# Patient Record
Sex: MALE | Race: OTHER | Employment: OTHER | URBAN - NONMETROPOLITAN AREA
[De-identification: names, ages, dates, MRNs, and addresses within clinical notes are randomized per-mention and may not be internally consistent; named-entity substitution may affect disease eponyms.]

---

## 2017-01-25 DIAGNOSIS — I10 BENIGN ESSENTIAL HTN: Chronic | ICD-10-CM

## 2017-01-25 DIAGNOSIS — E78.5 HYPERLIPIDEMIA, UNSPECIFIED HYPERLIPIDEMIA TYPE: ICD-10-CM

## 2017-01-25 RX ORDER — NEBIVOLOL 10 MG/1
10 TABLET ORAL DAILY
Qty: 90 TABLET | Refills: 3 | Status: SHIPPED | OUTPATIENT
Start: 2017-01-25 | End: 2017-08-15 | Stop reason: SDUPTHER

## 2017-02-14 ENCOUNTER — OFFICE VISIT (OUTPATIENT)
Dept: CARDIOLOGY | Age: 67
End: 2017-02-14
Payer: MEDICARE

## 2017-02-14 VITALS
DIASTOLIC BLOOD PRESSURE: 78 MMHG | WEIGHT: 276 LBS | SYSTOLIC BLOOD PRESSURE: 120 MMHG | HEART RATE: 60 BPM | HEIGHT: 71 IN | BODY MASS INDEX: 38.64 KG/M2

## 2017-02-14 DIAGNOSIS — I25.10 CORONARY ARTERY DISEASE INVOLVING NATIVE CORONARY ARTERY OF NATIVE HEART WITHOUT ANGINA PECTORIS: ICD-10-CM

## 2017-02-14 DIAGNOSIS — I10 BENIGN ESSENTIAL HTN: Primary | ICD-10-CM

## 2017-02-14 DIAGNOSIS — E78.5 HYPERLIPIDEMIA, UNSPECIFIED HYPERLIPIDEMIA TYPE: ICD-10-CM

## 2017-02-14 DIAGNOSIS — E78.2 MIXED HYPERLIPIDEMIA: ICD-10-CM

## 2017-02-14 PROCEDURE — 99212 OFFICE O/P EST SF 10 MIN: CPT | Performed by: INTERNAL MEDICINE

## 2017-02-14 RX ORDER — ALLOPURINOL 300 MG/1
300 TABLET ORAL DAILY
COMMUNITY

## 2017-02-15 RX ORDER — TRIAMTERENE AND HYDROCHLOROTHIAZIDE 75; 50 MG/1; MG/1
1 TABLET ORAL DAILY
Qty: 90 TABLET | Refills: 3 | Status: SHIPPED | OUTPATIENT
Start: 2017-02-15 | End: 2017-08-15 | Stop reason: SDUPTHER

## 2017-02-15 RX ORDER — LOSARTAN POTASSIUM 50 MG/1
50 TABLET ORAL DAILY
Qty: 90 TABLET | Refills: 3 | Status: SHIPPED | OUTPATIENT
Start: 2017-02-15 | End: 2017-08-15 | Stop reason: SDUPTHER

## 2017-08-15 ENCOUNTER — TELEPHONE (OUTPATIENT)
Dept: CARDIOLOGY | Age: 67
End: 2017-08-15

## 2017-08-15 ENCOUNTER — OFFICE VISIT (OUTPATIENT)
Dept: CARDIOLOGY | Age: 67
End: 2017-08-15
Payer: MEDICARE

## 2017-08-15 VITALS
WEIGHT: 273 LBS | DIASTOLIC BLOOD PRESSURE: 80 MMHG | HEIGHT: 71 IN | BODY MASS INDEX: 38.22 KG/M2 | HEART RATE: 78 BPM | SYSTOLIC BLOOD PRESSURE: 122 MMHG

## 2017-08-15 DIAGNOSIS — E78.2 MIXED HYPERLIPIDEMIA: Chronic | ICD-10-CM

## 2017-08-15 DIAGNOSIS — I10 BENIGN ESSENTIAL HTN: Chronic | ICD-10-CM

## 2017-08-15 DIAGNOSIS — I25.10 CORONARY ARTERY DISEASE INVOLVING NATIVE CORONARY ARTERY OF NATIVE HEART WITHOUT ANGINA PECTORIS: Primary | Chronic | ICD-10-CM

## 2017-08-15 DIAGNOSIS — E78.5 HYPERLIPIDEMIA, UNSPECIFIED HYPERLIPIDEMIA TYPE: ICD-10-CM

## 2017-08-15 DIAGNOSIS — Z95.5 HISTORY OF PLACEMENT OF STENT IN LAD CORONARY ARTERY: ICD-10-CM

## 2017-08-15 PROCEDURE — 99213 OFFICE O/P EST LOW 20 MIN: CPT | Performed by: NURSE PRACTITIONER

## 2017-08-15 PROCEDURE — 93000 ELECTROCARDIOGRAM COMPLETE: CPT | Performed by: NURSE PRACTITIONER

## 2017-08-15 RX ORDER — NITROGLYCERIN 0.4 MG/1
0.4 TABLET SUBLINGUAL EVERY 5 MIN PRN
Qty: 25 TABLET | Refills: 3 | Status: SHIPPED | OUTPATIENT
Start: 2017-08-15 | End: 2020-07-23 | Stop reason: SDUPTHER

## 2017-08-15 RX ORDER — SIMVASTATIN 10 MG
10 TABLET ORAL NIGHTLY
Qty: 90 TABLET | Refills: 3 | Status: SHIPPED | OUTPATIENT
Start: 2017-08-15

## 2017-08-15 RX ORDER — NEBIVOLOL 10 MG/1
10 TABLET ORAL DAILY
Qty: 90 TABLET | Refills: 3 | Status: SHIPPED | OUTPATIENT
Start: 2017-08-15 | End: 2018-03-01 | Stop reason: SDUPTHER

## 2017-08-15 RX ORDER — FUROSEMIDE 20 MG/1
20 TABLET ORAL DAILY
Qty: 90 TABLET | Refills: 3 | Status: SHIPPED | OUTPATIENT
Start: 2017-08-15 | End: 2018-08-28 | Stop reason: SDUPTHER

## 2017-08-15 RX ORDER — TRIAMTERENE AND HYDROCHLOROTHIAZIDE 75; 50 MG/1; MG/1
1 TABLET ORAL DAILY
Qty: 90 TABLET | Refills: 3 | Status: SHIPPED | OUTPATIENT
Start: 2017-08-15 | End: 2017-08-15 | Stop reason: ALTCHOICE

## 2017-08-15 RX ORDER — POTASSIUM CHLORIDE 20 MEQ/1
20 TABLET, EXTENDED RELEASE ORAL 2 TIMES DAILY
Qty: 180 TABLET | Refills: 3 | Status: SHIPPED | OUTPATIENT
Start: 2017-08-15

## 2017-08-15 RX ORDER — LOSARTAN POTASSIUM 50 MG/1
50 TABLET ORAL DAILY
Qty: 90 TABLET | Refills: 3 | Status: SHIPPED | OUTPATIENT
Start: 2017-08-15 | End: 2018-02-20

## 2018-01-10 ENCOUNTER — APPOINTMENT (OUTPATIENT)
Dept: LAB | Facility: HOSPITAL | Age: 68
End: 2018-01-10

## 2018-01-19 DIAGNOSIS — D50.8 OTHER IRON DEFICIENCY ANEMIA: Primary | ICD-10-CM

## 2018-01-22 ENCOUNTER — APPOINTMENT (OUTPATIENT)
Dept: LAB | Facility: HOSPITAL | Age: 68
End: 2018-01-22

## 2018-02-20 ENCOUNTER — OFFICE VISIT (OUTPATIENT)
Dept: CARDIOLOGY | Age: 68
End: 2018-02-20
Payer: MEDICARE

## 2018-02-20 VITALS
HEART RATE: 80 BPM | DIASTOLIC BLOOD PRESSURE: 104 MMHG | WEIGHT: 280 LBS | HEIGHT: 71 IN | BODY MASS INDEX: 39.2 KG/M2 | SYSTOLIC BLOOD PRESSURE: 150 MMHG | RESPIRATION RATE: 16 BRPM

## 2018-02-20 DIAGNOSIS — I25.10 CORONARY ARTERY DISEASE INVOLVING NATIVE CORONARY ARTERY OF NATIVE HEART WITHOUT ANGINA PECTORIS: Primary | ICD-10-CM

## 2018-02-20 DIAGNOSIS — I10 BENIGN ESSENTIAL HTN: ICD-10-CM

## 2018-02-20 DIAGNOSIS — E78.2 MIXED HYPERLIPIDEMIA: ICD-10-CM

## 2018-02-20 PROCEDURE — 99213 OFFICE O/P EST LOW 20 MIN: CPT | Performed by: INTERNAL MEDICINE

## 2018-02-20 RX ORDER — TAMSULOSIN HYDROCHLORIDE 0.4 MG/1
CAPSULE ORAL
Refills: 6 | COMMUNITY
Start: 2018-01-21 | End: 2019-05-28 | Stop reason: DRUGHIGH

## 2018-02-20 RX ORDER — LOSARTAN POTASSIUM 50 MG/1
50 TABLET ORAL DAILY
Refills: 5 | COMMUNITY
Start: 2017-12-31 | End: 2019-12-31 | Stop reason: SDUPTHER

## 2018-02-20 NOTE — PROGRESS NOTES
twice daily  No Continue current medications:       Yes           Discussed with patient and spouse. Follow Up Visit Scheduled for:  1 month(s)    I greatly appreciate the opportunity to meet Sharda Wood and your confidence in allowing me to participate in his cardiovascular care. [facility] of [address] [place] [address] am scribing for and in the presence of ERICA Pardo MD,FAC. Gladis Chavez MA     12:01 PM  IERICA MD, [facility], personally performed the services described in this documentation as scribed by Gladis Chavez in my presence, and it is both accurate and complete. Electronically signed by Sami Smith.  Junie Pardo MD, [facility]    2/22/18 2:10 PM

## 2018-03-01 DIAGNOSIS — I10 BENIGN ESSENTIAL HTN: Chronic | ICD-10-CM

## 2018-03-01 DIAGNOSIS — E78.5 HYPERLIPIDEMIA, UNSPECIFIED HYPERLIPIDEMIA TYPE: ICD-10-CM

## 2018-03-01 RX ORDER — NEBIVOLOL HYDROCHLORIDE 10 MG/1
10 TABLET ORAL DAILY
Qty: 90 TABLET | Refills: 3 | Status: SHIPPED | OUTPATIENT
Start: 2018-03-01 | End: 2018-04-02 | Stop reason: DRUGHIGH

## 2018-03-02 DIAGNOSIS — D50.9 IRON DEFICIENCY ANEMIA, UNSPECIFIED IRON DEFICIENCY ANEMIA TYPE: Primary | ICD-10-CM

## 2018-03-06 ENCOUNTER — APPOINTMENT (OUTPATIENT)
Dept: LAB | Facility: HOSPITAL | Age: 68
End: 2018-03-06

## 2018-04-02 ENCOUNTER — OFFICE VISIT (OUTPATIENT)
Dept: CARDIOLOGY | Age: 68
End: 2018-04-02
Payer: MEDICARE

## 2018-04-02 VITALS
SYSTOLIC BLOOD PRESSURE: 136 MMHG | WEIGHT: 273 LBS | BODY MASS INDEX: 38.22 KG/M2 | DIASTOLIC BLOOD PRESSURE: 78 MMHG | HEART RATE: 68 BPM | HEIGHT: 71 IN

## 2018-04-02 DIAGNOSIS — I25.10 CORONARY ARTERY DISEASE INVOLVING NATIVE CORONARY ARTERY OF NATIVE HEART WITHOUT ANGINA PECTORIS: Primary | Chronic | ICD-10-CM

## 2018-04-02 PROCEDURE — 93000 ELECTROCARDIOGRAM COMPLETE: CPT | Performed by: NURSE PRACTITIONER

## 2018-04-02 PROCEDURE — 99214 OFFICE O/P EST MOD 30 MIN: CPT | Performed by: NURSE PRACTITIONER

## 2018-04-02 RX ORDER — NEBIVOLOL 10 MG/1
10 TABLET ORAL 2 TIMES DAILY
COMMUNITY
End: 2018-05-14 | Stop reason: SDUPTHER

## 2018-05-14 RX ORDER — NEBIVOLOL 10 MG/1
10 TABLET ORAL 2 TIMES DAILY
Qty: 60 TABLET | Refills: 5 | Status: SHIPPED | OUTPATIENT
Start: 2018-05-14 | End: 2019-01-15 | Stop reason: SDUPTHER

## 2018-08-28 DIAGNOSIS — E78.2 MIXED HYPERLIPIDEMIA: Chronic | ICD-10-CM

## 2018-08-28 DIAGNOSIS — I10 BENIGN ESSENTIAL HTN: Chronic | ICD-10-CM

## 2018-08-28 DIAGNOSIS — I25.10 CORONARY ARTERY DISEASE INVOLVING NATIVE CORONARY ARTERY OF NATIVE HEART WITHOUT ANGINA PECTORIS: Chronic | ICD-10-CM

## 2018-08-28 RX ORDER — FUROSEMIDE 20 MG/1
20 TABLET ORAL DAILY
Qty: 90 TABLET | Refills: 3 | Status: SHIPPED | OUTPATIENT
Start: 2018-08-28 | End: 2019-12-31 | Stop reason: SDUPTHER

## 2018-10-08 ENCOUNTER — OFFICE VISIT (OUTPATIENT)
Dept: CARDIOLOGY | Age: 68
End: 2018-10-08
Payer: MEDICARE

## 2018-10-08 VITALS
HEART RATE: 64 BPM | WEIGHT: 279 LBS | BODY MASS INDEX: 39.06 KG/M2 | SYSTOLIC BLOOD PRESSURE: 118 MMHG | HEIGHT: 71 IN | DIASTOLIC BLOOD PRESSURE: 68 MMHG

## 2018-10-08 DIAGNOSIS — I10 BENIGN ESSENTIAL HTN: Primary | Chronic | ICD-10-CM

## 2018-10-08 PROCEDURE — 99213 OFFICE O/P EST LOW 20 MIN: CPT | Performed by: NURSE PRACTITIONER

## 2018-10-08 PROCEDURE — 93000 ELECTROCARDIOGRAM COMPLETE: CPT | Performed by: NURSE PRACTITIONER

## 2018-10-08 RX ORDER — FINASTERIDE 5 MG/1
5 TABLET, FILM COATED ORAL DAILY
COMMUNITY

## 2018-10-08 RX ORDER — POTASSIUM CHLORIDE 14.9 MG/ML
20 INJECTION INTRAVENOUS ONCE
COMMUNITY
End: 2019-05-28 | Stop reason: ALTCHOICE

## 2018-10-08 RX ORDER — TRIAMTERENE AND HYDROCHLOROTHIAZIDE 37.5; 25 MG/1; MG/1
1 TABLET ORAL DAILY
COMMUNITY
End: 2019-12-31 | Stop reason: SDUPTHER

## 2018-10-08 NOTE — PATIENT INSTRUCTIONS
Coronary Artery Disease   (CAD; Coronary Atherosclerosis; Silent MI; Coronary Heart Disease; Ischemic Heart Disease; Atherosclerosis of the Coronary Arteries)     Definition   Coronary arteries bring oxygen rich blood to the heart muscle. Coronary artery disease (CAD) is blockage of these arteries. If the blockage is complete, areas of the heart muscle may be damaged. In severe case the heart muscle dies. This can lead to a heart attack, also known as a myocardial infarction (MI). Coronary artery disease is the most common form of heart disease. It is the leading cause of death worldwide. Causes include: Thickening of the walls of the arteries feeding the heart muscle   Accumulation of fatty plaques within the coronary arteries   Sudden spasm of a coronary artery   Narrowing of the coronary arteries   Inflammation within the coronary arteries   Development of a blood clot within the coronary arteries that blocks blood flow      Major risk factors include:   Sex: male (men have a greater risk of heart attack than women)   Age: 39 and older for men, 54 and older for women   Heredity: strong family history of heart disease   Obesity and being overweight   Smoking   High blood pressure   Sedentary lifestylePoor fitness can also increase your risk of CAD and premature death. High cholesterol (specifically, high LDL cholesterol, and low HDL cholesterol)   Diabetes   Metabolic syndrome (combination of high blood pressure, abdominal obesity, and insulin resistance)     Other risk factors may include:   Sleep Apnea  Stress   Excessive alcohol use   Depression   A diet that is high in saturated fat, trans fat, cholesterol, and/or caloriesDrinking sugary beverages on a regular basis may increase your risk of CAD. Symptoms   CAD may progress without any symptoms. Angina is chest pain that comes and goes. It often has a squeezing or pressure-like quality. It may radiate into the shoulder(s), arm(s), or jaw. only aimed at correcting your cholesterol levels, but also at lowering your overall risk for heart disease and strokes. Diet Changes   Eat a diet low in total fat, saturated fat, and cholesterol . Reduce or eliminate the amount of alcohol you drink. Eat more high-fiber foods. Lifestyle Changes   If you are overweight, lose weight . If you smoke, quit . Exercise regularly . Talk to you doctor before starting an exercise program. Veverly Devoid may already have hardening of the arteries or heart disease. These conditions increase your risk of having a heart attack while exercising. Make sure other medical conditions such as high blood pressure and diabetes are being treated and controlled. Medications   There are a number of drugs available, such as statins , to treat this condition and help lower your risk for heart disease. Talk to your doctor. Statins have been shown to reduce mortality (death), heart attacks , and stroke. These medicines are best used as additions to diet and exercise and should not replace healthy lifestyle changes. Prevention   To help reduce your chance of getting hyperlipidemia, take the following steps:   Starting at age 21, get cholesterol tests. Eat a diet low in total fat, saturated fat, and cholesterol. If you smoke, quit. Drink alcohol in moderation (two drinks per day for men, one drink per day for women). If you are overweight, lose weight. Exercise regularly. Talk with your doctor first.   If you have diabetes , control your blood sugar. Talk to your doctor about medications you are taking. They may have side effects that cause hyperlipidemia. Last Reviewed: September 2010 Mina Hopper DO   Updated: 11/9/2010       Heart-Healthy Diet   Sodium, Fat, and Cholesterol Controlled Diet     What Is a Heart Healthy Diet? A heart-healthy diet is one that limits sodium , certain types of fat , and cholesterol .  This type of diet is recommended for:   · People with of having a heart attack. You want this level to be high (ideally greater than 60). It is a risk to have a level less than 40. You can raise this good cholesterol by eating olive oil, canola oil, avocados, or nuts. Exercise raises this level, too. Fat   Fat is calorie dense and packs a lot of calories into a small amount of food. Even though fats should be limited due to their high calorie content, not all fats are bad. In fact, some fats are quite healthful. Fat can be broken down into four main types. · The good-for-you fats are:   ¨ Monounsaturated fat found in oils such as olive and canola, avocados, and nuts and natural nut butters; can decrease cholesterol levels, while keeping levels of HDL cholesterol high   ¨ Polyunsaturated fat found in oils such as safflower, sunflower, soybean, corn, and sesame; can decrease total cholesterol and LDL cholesterol   ¨ Omega-3 fatty acids particularly those found in fatty fish (such as salmon, trout, tuna, mackerel, herring, and sardines); can decrease risk of arrhythmias, decrease triglyceride levels, and slightly lower blood pressure   · The fats that you want to limit are:   ¨ Saturated fat found in animal products, many fast foods, and a few vegetables; increases total blood cholesterol, including LDL levels   § Animal fats that are saturated include: butter, lard, whole-milk dairy products, meat fat, and poultry skin   § Vegetable fats that are saturated include: hydrogenated shortening, palm oil, coconut oil, cocoa butter   ¨ Hydrogenated or trans fat found in margarine and vegetable shortening, most shelf stable snack foods, and fried foods; increases LDL and decreases HDL     It is generally recommended that you limit your total fat for the day to less than 30% of your total calories. If you follow an 1800-calorie heart healthy diet, for example, this would mean 60 grams of fat or less per day.    Saturated fat and trans fat in your diet raises your blood cured, salted, or canned meat, fish, or poultry (including storey, chipped beef, cold cuts, hot dogs, sausages, sardines, and anchovies) Poultry skins Breaded and/or fried fish or meats Canned peas, beans, and lentils Salted nuts   Fats and Oils   Olive oil and canola oil Low-sodium, low-fat salad dressings and mayonnaise   Butter, margarine, coconut and palm oils, storey fat   Snacks, Sweets, and Condiments   Low-sodium or unsalted versions of broths, soups, soy sauce, and condiments Pepper, herbs, and spices; vinegar, lemon, or lime juice Low-fat frozen desserts (yogurt, sherbet, fruit bars) Sugar, cocoa powder, honey, syrup, jam, and preserves Low-fat, trans-fat free cookies, cakes, and pies Jose and animal crackers, fig bars, maryann snaps   High-fat desserts Broth, soups, gravies, and sauces, made from instant mixes or other high-sodium ingredients Salted snack foods Canned olives Meat tenderizers, seasoning salt, and most flavored vinegars   Beverages   Low-sodium carbonated beverages Tea and coffee in moderation Soy milk   Commercially softened water   Suggestions   · Make whole grains, fruits, and vegetables the base of your diet. · Choose heart-healthy fats such as canola, olive, and flaxseed oil, and foods high in heart-healthy fats, such as nuts, seeds, soybeans, tofu, and fish. · Eat fish at least twice per week; the fish highest in omega-3 fatty acids and lowest in mercury include salmon, herring, mackerel, sardines, and canned chunk light tuna. If you eat fish less than twice per week or have high triglycerides, talk to your doctor about taking fish oil supplements. · Read food labels. ¨ For products low in fat and cholesterol, look for fat free, low-fat, cholesterol free, saturated fat free, and trans fat freeAlso scan the Nutrition Facts Label, which lists saturated fat, trans fat, and cholesterol amounts.    ¨ For products low in sodium, look for sodium free, very low sodium, low sodium, no added salt, and unsalted   · Skip the salt when cooking or at the table; if food needs more flavor, get creative and try out different herbs and spices. Garlic and onion also add substantial flavor to foods. · Trim any visible fat off meat and poultry before cooking, and drain the fat off after de león. · Use cooking methods that require little or no added fat, such as grilling, boiling, baking, poaching, broiling, roasting, steaming, stir-frying, and sauting. · Avoid fast food and convenience food. They tend to be high in saturated and trans fat and have a lot of added salt. · Talk to a registered dietitian for individualized diet advice.      Last Reviewed: March 2011 Salomon Cartwright MS, MPH, RD   Updated: 3/29/2011

## 2018-10-08 NOTE — PROGRESS NOTES
10/1/2012    Polycythemia 10/1/2012    S/P PTCA (percutaneous transluminal coronary angioplasty) 9/28/2012       Past Surgical History:   Procedure Laterality Date    BACK SURGERY      CORONARY ANGIOPLASTY WITH STENT PLACEMENT  1/2010    KNEE SURGERY      Jonathan    PTCA  2010       Family History   Problem Relation Age of Onset    Heart Disease Mother     High Blood Pressure Mother     High Blood Pressure Father     Heart Disease Maternal Uncle        Social History     Social History    Marital status:      Spouse name: N/A    Number of children: N/A    Years of education: N/A     Occupational History    Not on file.      Social History Main Topics    Smoking status: Former Smoker     Types: Cigarettes     Quit date: 10/1/1977    Smokeless tobacco: Never Used    Alcohol use Yes      Comment: rarely    Drug use: No    Sexual activity: Not on file     Other Topics Concern    Not on file     Social History Narrative    No narrative on file       No Known Allergies      Current Outpatient Prescriptions:     triamterene-hydrochlorothiazide (MAXZIDE-25) 37.5-25 MG per tablet, Take 1 tablet by mouth daily, Disp: , Rfl:     potassium chloride 20 MEQ/100ML IVPB, Infuse 20 mEq intravenously once, Disp: , Rfl:     finasteride (PROSCAR) 5 MG tablet, Take 5 mg by mouth daily, Disp: , Rfl:     furosemide (LASIX) 20 MG tablet, TAKE 1 TABLET BY MOUTH DAILY, Disp: 90 tablet, Rfl: 3    nebivolol (BYSTOLIC) 10 MG tablet, Take 1 tablet by mouth 2 times daily, Disp: 60 tablet, Rfl: 5    VENTOLIN  (90 Base) MCG/ACT inhaler, INHALE 2 PUFFS BY MOUTH 4 TIMES A DAY AS NEEDED, Disp: , Rfl: 0    losartan (COZAAR) 100 MG tablet, TAKE 1 TABLET BY MOUTH EVERY DAY, Disp: , Rfl: 5    tamsulosin (FLOMAX) 0.4 MG capsule, TAKE ONE CAPSULE BY MOUTH EVERY DAY, Disp: , Rfl: 6    nitroGLYCERIN (NITROSTAT) 0.4 MG SL tablet, Place 1 tablet under the tongue every 5 minutes as needed for Chest pain, Disp: 25 tablet,

## 2019-01-15 RX ORDER — NEBIVOLOL HYDROCHLORIDE 10 MG/1
TABLET ORAL
Qty: 60 TABLET | Refills: 5 | Status: SHIPPED | OUTPATIENT
Start: 2019-01-15 | End: 2019-08-04 | Stop reason: SDUPTHER

## 2019-04-08 ENCOUNTER — TELEPHONE (OUTPATIENT)
Dept: CARDIOLOGY | Age: 69
End: 2019-04-08

## 2019-04-09 ENCOUNTER — TELEPHONE (OUTPATIENT)
Dept: CARDIOLOGY | Age: 69
End: 2019-04-09

## 2019-05-28 ENCOUNTER — OFFICE VISIT (OUTPATIENT)
Dept: CARDIOLOGY | Age: 69
End: 2019-05-28
Payer: MEDICARE

## 2019-05-28 VITALS
WEIGHT: 277 LBS | BODY MASS INDEX: 38.78 KG/M2 | HEART RATE: 64 BPM | HEIGHT: 71 IN | SYSTOLIC BLOOD PRESSURE: 140 MMHG | DIASTOLIC BLOOD PRESSURE: 90 MMHG

## 2019-05-28 DIAGNOSIS — I25.10 CORONARY ARTERY DISEASE INVOLVING NATIVE CORONARY ARTERY OF NATIVE HEART WITHOUT ANGINA PECTORIS: Primary | Chronic | ICD-10-CM

## 2019-05-28 DIAGNOSIS — Z95.5 HISTORY OF PLACEMENT OF STENT IN LAD CORONARY ARTERY: ICD-10-CM

## 2019-05-28 DIAGNOSIS — I10 BENIGN ESSENTIAL HTN: Chronic | ICD-10-CM

## 2019-05-28 DIAGNOSIS — E78.2 MIXED HYPERLIPIDEMIA: Chronic | ICD-10-CM

## 2019-05-28 PROCEDURE — 99214 OFFICE O/P EST MOD 30 MIN: CPT | Performed by: NURSE PRACTITIONER

## 2019-05-28 RX ORDER — AMLODIPINE BESYLATE 5 MG/1
5 TABLET ORAL DAILY
Qty: 30 TABLET | Refills: 5 | Status: SHIPPED | OUTPATIENT
Start: 2019-05-28 | End: 2019-12-05 | Stop reason: SDUPTHER

## 2019-05-28 RX ORDER — TAMSULOSIN HYDROCHLORIDE 0.4 MG/1
0.4 CAPSULE ORAL 2 TIMES DAILY
COMMUNITY

## 2019-05-28 RX ORDER — M-VIT,TX,IRON,MINS/CALC/FOLIC 27MG-0.4MG
1 TABLET ORAL DAILY
COMMUNITY

## 2019-05-28 NOTE — PATIENT INSTRUCTIONS
Add amlodipine 5 mg one daily for high blood pressure. Continue current medications as prescribed. Continue to follow up with primary care provider for non cardiac medical problems. Call the office with any problems, questions or concerns at [phone]. Follow up as scheduled with your cardiologist. 6 months. The following educational material has been included in this after visit summary for your review: Life simple 7. Hypertension. Heart health.     Additional instructions:  Coronary artery disease risk factors you can control: Smoking, high blood pressure, high cholesterol, diabetes, being overweight, lack of exercise and stress. Continue heart healthy diet. Take medications as directed. Exercise as tolerated. Strive for 15 minutes of exercise most days of the week. Keep a blood pressure log, do so for 2 weeks. Call the office to report readings at [phone]. Blood pressure goal  is less than 120/70. Elevated blood pressure at 120-129/80 or less. High blood pressure at 130-139/80-89. If you are taking cholesterol lowering medications, it is recommended that lab work be checked annually. Always keep a current medication list. Bring your medications to every office visit. Life simple 7  1) Manage blood pressure - high blood pressure is a major risk factor for heart disease and stroke. Keeping blood pressure in health range reduces strain on your heart, arteries and kidneys. 2) Control cholesterol - contributes to plaque, which can clog arteries and lead to heart disease and stroke. When you control your cholesterol you are giving your arteries their best chance to remain clear. 3) Reduce blood sugar - most of the food we eat is turning into glucose or blood sugar that our body uses for energy. Over time, high levels of blood sugar can damage your heart, kidneys, eyes and nerves.   4) Get active - living an active life is one of the most rewarding gifts you can give yourself and those you love. Simply put, daily physical activity increases your length and quality of life. 5)  Eat better - A healthy diet is one of your best weapons for fighting cardiovascular disease. When you eat a heart healthy diet, you improve your chances for feeling good and staying healthy for life. 6)  Lose weight - when you shed extra fat an unnecessary pounds, you reduce the burden on your hear, lungs, blood vessels and skeleton. You give yourself the gift of active living, you lower your blood pressure and help yourself feel better. 7) Stop smoking - cigarette smokers have a higher risk of developing cardiovascular disease. If  You smoke, quitting is the best thing you can do for your health. Check American Heart Association on line for more information on Life's Simple 7 and tips for healthy living.       Patient Education        Learning About High Blood Pressure  What is high blood pressure? Blood pressure is a measure of how hard the blood pushes against the walls of your arteries. It's normal for blood pressure to go up and down throughout the day, but if it stays up, you have high blood pressure. Another name for high blood pressure is hypertension. Two numbers tell you your blood pressure. The first number is the systolic pressure. It shows how hard the blood pushes when your heart is pumping. The second number is the diastolic pressure. It shows how hard the blood pushes between heartbeats, when your heart is relaxed and filling with blood. Your doctor will give you a goal for your blood pressure. Your goal will be based on your health and your age. High blood pressure (hypertension) means that the top number stays high, or the bottom number stays high, or both. High blood pressure increases the risk of stroke, heart attack, and other problems. You and your doctor will talk about your risks of these problems based on your blood pressure. What happens when you have high blood pressure?   · Blood flows through your arteries with too much force. Over time, this damages the walls of your arteries. But you can't feel it. High blood pressure usually doesn't cause symptoms. · Fat and calcium start to build up in your arteries. This buildup is called plaque. Plaque makes your arteries narrower and stiffer. Blood can't flow through them as easily. · This lack of good blood flow starts to damage some of the organs in your body. This can lead to problems such as coronary artery disease and heart attack, heart failure, stroke, kidney failure, and eye damage. How can you prevent high blood pressure? · Stay at a healthy weight. · Try to limit how much sodium you eat to less than 2,300 milligrams (mg) a day. If you limit your sodium to 1,500 mg a day, you can lower your blood pressure even more. ? Buy foods that are labeled \"unsalted,\" \"sodium-free,\" or \"low-sodium. \" Foods labeled \"reduced-sodium\" and \"light sodium\" may still have too much sodium. ? Flavor your food with garlic, lemon juice, onion, vinegar, herbs, and spices instead of salt. Do not use soy sauce, steak sauce, onion salt, garlic salt, mustard, or ketchup on your food. ? Use less salt (or none) when recipes call for it. You can often use half the salt a recipe calls for without losing flavor. · Be physically active. Get at least 30 minutes of exercise on most days of the week. Walking is a good choice. You also may want to do other activities, such as running, swimming, cycling, or playing tennis or team sports. · Limit alcohol to 2 drinks a day for men and 1 drink a day for women. · Eat plenty of fruits, vegetables, and low-fat dairy products. Eat less saturated and total fats. How is high blood pressure treated? · Your doctor will suggest making lifestyle changes to help your heart. For example, your doctor may ask you to eat healthy foods, quit smoking, lose extra weight, and be more active.   · If lifestyle changes don't help enough, your doctor may recommend that you take medicine. · When blood pressure is very high, medicines are needed to lower it. Follow-up care is a key part of your treatment and safety. Be sure to make and go to all appointments, and call your doctor if you are having problems. It's also a good idea to know your test results and keep a list of the medicines you take. Where can you learn more? Go to https://JolieBoxpepicewWTFast.Conformia Software. org and sign in to your Alexis Bittar account. Enter P501 in the [employer] box to learn more about \"Learning About High Blood Pressure. \"     If you do not have an account, please click on the \"Sign Up Now\" link. Current as of: July 22, 2018  Content Version: 12.0  © 2758-0381 Healthwise, [employer]. Care instructions adapted under license by [facility] ([facility]). If you have questions about a medical condition or this instruction, always ask your healthcare professional. [place] [address] any warranty or liability for your use of this information. Patient Education        A Healthy Heart: Care Instructions  Your Care Instructions    Heart disease occurs when a substance called plaque builds up in the vessels that supply oxygen-rich blood to your heart. This can narrow the blood vessels and reduce blood flow. A heart attack happens when blood flow is completely blocked. A high-fat diet, smoking, and other factors increase the risk of heart disease. Your doctor has found that you have a chance of having heart disease. You can do lots of things to keep your heart healthy. It may not be easy, but you can change your diet, exercise more, and quit smoking. These steps really work to lower your chance of heart disease. Follow-up care is a key part of your treatment and safety. Be sure to make and go to all appointments, and call your doctor if you are having problems. It's also a good idea to know your test results and keep a list of the medicines you take.   How can you care for yourself at home? Diet    · Use less salt when you cook and eat. This helps lower your blood pressure. Taste food before salting. Add only a little salt when you think you need it. With time, your taste buds will adjust to less salt.     · Eat fewer snack items, fast foods, canned soups, and other high-salt, high-fat, processed foods.     · Read food labels and try to avoid saturated and trans fats. They increase your risk of heart disease by raising cholesterol levels.     · Limit the amount of solid fat-butter, margarine, and shortening-you eat. Use olive, peanut, or canola oil when you cook. Bake, broil, and steam foods instead of frying them.     · Eating fish can lower your risk for heart disease. Eat at least 2 servings of fish a week. [place], mackerel, herring, sardines, and chunk light tuna are very good choices. These fish contain omega-3 fatty acids.     · Eat a variety of fruit and vegetables every day. Dark green, deep orange, red, or yellow fruits and vegetables are especially good for you. Examples include spinach, carrots, peaches, and berries.     · Foods high in fiber can reduce your cholesterol and provide important vitamins and minerals. High-fiber foods include whole-grain cereals and breads, oatmeal, beans, brown rice, citrus fruits, and apples.     · Limit drinks and foods with added sugar. These include candy, desserts, and soda pop.    Lifestyle changes    · If your doctor recommends it, get more exercise. Walking is a good choice. Bit by bit, increase the amount you walk every day. Try for at least 30 minutes on most days of the week. You also may want to swim, bike, or do other activities.     · Do not smoke. If you need help quitting, talk to your doctor about stop-smoking programs and medicines. These can increase your chances of quitting for good. Quitting smoking may be the most important step you can take to protect your heart. It is never too late to quit.  You will get health benefits right away.     · Limit alcohol to 2 drinks a day for men and 1 drink a day for women. Too much alcohol can cause health problems. Medicines    · Take your medicines exactly as prescribed. Call your doctor if you think you are having a problem with your medicine.     · If your doctor recommends aspirin, take the amount directed each day. Make sure you take aspirin and not another kind of pain reliever, such as acetaminophen (Tylenol). If you take ibuprofen (such as Advil or Motrin) for other problems, take aspirin at least 2 hours before taking ibuprofen. When should you call for help? Call 911 if you have symptoms of a heart attack. These may include:    · Chest pain or pressure, or a strange feeling in the chest.     · Sweating.     · Shortness of breath.     · Pain, pressure, or a strange feeling in the back, neck, jaw, or upper belly or in one or both shoulders or arms.     · Lightheadedness or sudden weakness.     · A fast or irregular heartbeat.    After you call 911, the  may tell you to chew 1 adult-strength or 2 to 4 low-dose aspirin. Wait for an ambulance. Do not try to drive yourself.   Watch closely for changes in your health, and be sure to contact your doctor if you have any problems. Where can you learn more? Go to https://SeptRx.Kollabora. org and sign in to your Gizmoz account. Enter D719 in the [facility] box to learn more about \"A Healthy Heart: Care Instructions. \"     If you do not have an account, please click on the \"Sign Up Now\" link. Current as of: July 22, 2018  Content Version: 12.0  © 5469-3386 Healthwise, [employer]. Care instructions adapted under license by [facility] ([facility]). If you have questions about a medical condition or this instruction, always ask your healthcare professional. [address] any warranty or liability for your use of this information.

## 2019-05-28 NOTE — PROGRESS NOTES
[facility], [place]. [address], [facility] [address] [facility]  [phone] office  [phone] fax      OFFICE VISIT:  2019    Ruperto Chavis - : 1950    Reason For Visit:  Frederick Albarado is a 68 y.o. male who is here for 6 Month Follow-Up (no cardiac symptoms) with history of the followin. Coronary artery disease involving native coronary artery of native heart without angina pectoris     2. History of placement of stent in LAD coronary artery     3. Benign essential HTN     4. Mixed hyperlipidemia       The patient presents today for cardiology follow up. Overall, the patient is doing well from a cardiac standpoint without symptoms to suggest myocardial ischemia. BP is running high on current regimen. The patient's PCP monitors cholesterol. Non smoker. Very sedentary. Uses CPAP for sleep apnea. Leola Barraza denies exertional chest pain, shortness of breath, orthopnea, paroxysmal nocturnal dyspnea, syncope, presyncope, sustained arrythmia, edema and fatigue. The patient denies numbness or weakness to suggest cerebrovascular accident or transient ischemic attack. Ruperto Chavis [facility] the following [facility]:    Patient Active Problem List   Diagnosis Code    CAD (coronary artery disease) I25.10    Hyperlipidemia E78.5    Benign essential HTN I10    Obstructive sleep apnea G47.33    Polycythemia D75.1    History of placement of stent in LAD coronary artery Z95.5    Exertional chest pain R07.9    History of coronary artery stent placement Z95.5     Past Medical History:   Diagnosis Date    Benign essential HTN 10/1/2012    CAD (coronary artery disease) 2012    History of placement of stent in LAD coronary artery 2014    11/26/10 stents x 2 to mid LAD    Hyperlipidemia 10/1/2012    Dr. Corrine Marinelli follows lipids.     Obstructive sleep apnea 10/1/2012    Polycythemia 10/1/2012    S/P PTCA [facility]) per tablet Take 1 tablet by mouth daily.  Loratadine (CLARITIN) 10 MG CAPS Take 1 tablet by mouth daily.  Calcium Carbonate-Vitamin D (CALCIUM + D) 600-200 MG-UNIT TABS Take 1 tablet by mouth daily.  aspirin 81 MG EC tablet Take 81 mg by mouth daily. No current facility-administered medications for this visit. Allergies: Patient has no known allergies. Review of Systems  Constitutional - no appetite change, or unexpected weight change. No fever, chills or diaphoresis. No significant change in activity level or new onset of fatigue. HEENT - no significant rhinorrhea or epistaxis. No tinnitus or significant hearing loss. Eyes - no sudden vision change or amaurosis. No corneal arcus, xantholasma, subconjunctival hemorrhage or discharge. Respiratory - no significant wheezing, stridor, apnea or cough. No dyspnea on exertion or shortness of air. Cardiovascular - no exertional chest pain to suggest myocardial ischemia. No orthopnea or PND. No sensation of sustained arrythmia. No occurrence of slow heart rate. No palpitations. No claudication. No leg edema. Gastrointestinal - no abdominal swelling or pain. No blood in stool. No severe constipation, diarrhea, nausea, or vomiting. Genitourinary - no dysuria, frequency, or urgency. No flank pain or hematuria. Musculoskeletal - no back pain or myalgia. No problems with gait. Extremities - no clubbing, cyanosis or edema. Skin - no color change or rash. No pallor. No new surgical incision. Neurologic - no speech difficulty, facial asymmetry or lateralizing weakness. No seizures, presyncope or syncope. No significant dizziness. Hematologic - no easy bruising or excessive bleeding. Psychiatric - no severe anxiety or insomnia. No confusion. All other review of systems are negative.     Objective  Vital Signs - BP (!) 174/108 (Site: Right Upper Arm, Position: Sitting, Cuff Size: Large Adult)   Pulse 64 Ht 5' 11\" (1.803 m)   Wt 277 lb (125.6 kg)   BMI 38.63 kg/m²   General - Erinn Hartley is alert, cooperative, and pleasant. Well groomed. No acute distress. Body habitus - Body mass index is 38.63 kg/m². HEENT - Head is normocephalic. No circumoral cyanosis. Dentition is normal.  EYES -   Lids normal without ptosis. No discharge, edema or subconjunctival hemorrhage. Neck - Symmetrical without apparent mass or lymphadenopathy. Respiratory - Normal respiratory effort without use of accessory muscles. Ausculatation reveals vesicular breath sounds without crackles, wheezes, rub or rhonchi. Cardiovascular - No jugular venous distention. Auscultation reveals regular rate and rhythm. No audible clicks, gallop or rub. No murmur. No lower extremity varicosities. No carotid bruits. Abdominal -  No visible distention, mass or pulsations. Extremities - No clubbing or cyanosis. No statis dermatitis or ulcers. No edema. Musculoskeletal -   No Osler's nodes. No kyphosis or scoliosis. Gait is even and regular without limp or shuffle. Ambulates without assistance. Skin -  Warm and dry; no rash or pallor. No new surgical wound. Neurological - No focal neurological deficits. Thought processes coherent. No apparent tremor. Oriented to person, place and time. Psychiatric -  Appropriate affect and mood. Assessment:     Diagnosis Orders   1. Coronary artery disease involving native coronary artery of native heart without angina pectoris     2. History of placement of stent in LAD coronary artery     3. Benign essential HTN     4. Mixed hyperlipidemia       Stable CV status without symptoms of overt heart failure, arrhythmia or angina. CAD medical management includes Bystolic, Losartan, Zocor and ASA. BP remains elevated on current anti-hypertensive regimen. Add amlodipine 5 mg daily. Patient will call back BP log in 2 weeks. PCP follows lipids - tolerating statin. Non smoker.

## 2019-06-26 ENCOUNTER — TELEPHONE (OUTPATIENT)
Dept: CARDIOLOGY | Age: 69
End: 2019-06-26

## 2019-08-05 RX ORDER — NEBIVOLOL HYDROCHLORIDE 10 MG/1
TABLET ORAL
Qty: 180 TABLET | Refills: 3 | Status: SHIPPED | OUTPATIENT
Start: 2019-08-05 | End: 2020-07-23

## 2019-12-05 RX ORDER — AMLODIPINE BESYLATE 5 MG/1
TABLET ORAL
Qty: 90 TABLET | Refills: 3 | Status: SHIPPED | OUTPATIENT
Start: 2019-12-05 | End: 2020-08-05 | Stop reason: SDUPTHER

## 2019-12-31 ENCOUNTER — OFFICE VISIT (OUTPATIENT)
Dept: CARDIOLOGY | Age: 69
End: 2019-12-31
Payer: MEDICARE

## 2019-12-31 VITALS
DIASTOLIC BLOOD PRESSURE: 84 MMHG | BODY MASS INDEX: 39.76 KG/M2 | HEART RATE: 84 BPM | SYSTOLIC BLOOD PRESSURE: 110 MMHG | WEIGHT: 284 LBS | HEIGHT: 71 IN

## 2019-12-31 DIAGNOSIS — I10 BENIGN ESSENTIAL HTN: ICD-10-CM

## 2019-12-31 DIAGNOSIS — I25.10 CORONARY ARTERY DISEASE INVOLVING NATIVE CORONARY ARTERY OF NATIVE HEART WITHOUT ANGINA PECTORIS: Primary | ICD-10-CM

## 2019-12-31 DIAGNOSIS — E78.2 MIXED HYPERLIPIDEMIA: ICD-10-CM

## 2019-12-31 PROCEDURE — 99212 OFFICE O/P EST SF 10 MIN: CPT | Performed by: INTERNAL MEDICINE

## 2019-12-31 RX ORDER — TRIAMTERENE AND HYDROCHLOROTHIAZIDE 37.5; 25 MG/1; MG/1
1 TABLET ORAL DAILY
Qty: 90 TABLET | Refills: 3 | Status: SHIPPED | OUTPATIENT
Start: 2019-12-31 | End: 2021-02-24 | Stop reason: SDUPTHER

## 2019-12-31 RX ORDER — FUROSEMIDE 20 MG/1
20 TABLET ORAL DAILY
Qty: 90 TABLET | Refills: 3 | Status: SHIPPED | OUTPATIENT
Start: 2019-12-31

## 2019-12-31 RX ORDER — LOSARTAN POTASSIUM 50 MG/1
50 TABLET ORAL DAILY
Qty: 90 TABLET | Refills: 3 | Status: SHIPPED | OUTPATIENT
Start: 2019-12-31 | End: 2020-07-23

## 2020-06-23 ENCOUNTER — OFFICE VISIT (OUTPATIENT)
Dept: CARDIOLOGY | Age: 70
End: 2020-06-23
Payer: MEDICARE

## 2020-06-23 VITALS — SYSTOLIC BLOOD PRESSURE: 138 MMHG | DIASTOLIC BLOOD PRESSURE: 64 MMHG | OXYGEN SATURATION: 98 % | HEART RATE: 71 BPM

## 2020-06-23 PROCEDURE — 99214 OFFICE O/P EST MOD 30 MIN: CPT | Performed by: NURSE PRACTITIONER

## 2020-06-23 PROCEDURE — 93000 ELECTROCARDIOGRAM COMPLETE: CPT | Performed by: NURSE PRACTITIONER

## 2020-06-23 RX ORDER — AMLODIPINE BESYLATE 5 MG/1
5 TABLET ORAL 2 TIMES DAILY
COMMUNITY
End: 2020-08-05

## 2020-06-23 RX ORDER — LOSARTAN POTASSIUM 100 MG/1
100 TABLET ORAL DAILY
COMMUNITY

## 2020-06-23 RX ORDER — TRIAMTERENE AND HYDROCHLOROTHIAZIDE 75; 50 MG/1; MG/1
1 TABLET ORAL DAILY
COMMUNITY
End: 2020-07-23

## 2020-06-23 ASSESSMENT — ENCOUNTER SYMPTOMS
BLOOD IN STOOL: 0
COLOR CHANGE: 0
COUGH: 0
BACK PAIN: 1
ABDOMINAL PAIN: 0
EYE DISCHARGE: 0
FACIAL SWELLING: 0
WHEEZING: 0
EYE REDNESS: 0
TROUBLE SWALLOWING: 0
VOMITING: 0
NAUSEA: 0
ABDOMINAL DISTENTION: 0
SHORTNESS OF BREATH: 0

## 2020-06-23 NOTE — PROGRESS NOTES
(coronary artery disease) 2012    History of placement of stent in LAD coronary artery 2014    11/26/10 stents x 2 to mid LAD    Hyperlipidemia 10/1/2012    Dr. Katarina Pappas follows lipids.     Obstructive sleep apnea 10/1/2012    Polycythemia 10/1/2012    S/P PTCA (percutaneous transluminal coronary angioplasty) 2012     Past Surgical History:   Procedure Laterality Date    BACK SURGERY      CORONARY ANGIOPLASTY WITH STENT PLACEMENT  2010    KNEE SURGERY      Jonathan    PTCA       Family History   Problem Relation Age of Onset    Heart Disease Mother     High Blood Pressure Mother     High Blood Pressure Father     Heart Disease Maternal Uncle      Social History     Tobacco Use    Smoking status: Former Smoker     Types: Cigarettes     Last attempt to quit: 10/1/1977     Years since quittin.7    Smokeless tobacco: Never Used   Substance Use Topics    Alcohol use: Yes     Comment: rarely      Current Outpatient Medications   Medication Sig Dispense Refill    triamterene-hydrochlorothiazide (MAXZIDE) 75-50 MG per tablet Take 1 tablet by mouth daily      losartan (COZAAR) 100 MG tablet Take 100 mg by mouth daily      amLODIPine (NORVASC) 5 MG tablet Take 5 mg by mouth 2 times daily      apixaban (ELIQUIS) 5 MG TABS tablet Take 1 tablet by mouth 2 times daily 180 tablet 1    furosemide (LASIX) 20 MG tablet Take 1 tablet by mouth daily 90 tablet 3    amLODIPine (NORVASC) 5 MG tablet TAKE 1 TABLET BY MOUTH EVERY DAY (Patient taking differently: Take 5 mg by mouth 2 times daily ) 90 tablet 3    BYSTOLIC 10 MG tablet TAKE 1 TABLET BY MOUTH TWICE A  tablet 3    Multiple Vitamins-Minerals (THERAPEUTIC MULTIVITAMIN-MINERALS) tablet Take 1 tablet by mouth daily      tamsulosin (FLOMAX) 0.4 MG capsule Take 0.4 mg by mouth 2 times daily      finasteride (PROSCAR) 5 MG tablet Take 5 mg by mouth daily      VENTOLIN  (90 Base) MCG/ACT inhaler INHALE 2 PUFFS BY MOUTH 4 TIMES

## 2020-06-23 NOTE — PATIENT INSTRUCTIONS
first floor of [place] [address] through [facility] main entrance and turn immediately to your left. Date/Time:     Patient's contact number:  [phone] (home)     Echocardiogram -  No prep. Takes approximately 30 min. An echocardiogram uses sound waves to produce images of your heart. This commonly used test allows your doctor to see how your heart is beating and pumping blood. Your doctor can use the images from an echocardiogram to identify various abnormalities in the heart muscle and valves. This test has 2 parts:   Ø You will be asked to disrobe from the waist up and given a gown to wear. The technologist will then hook up an EKG monitor to you for the entire exam.   Ø You will then have an ultrasound of your heart (echocardiogram) to assess the heart muscle, heart valves and heart function. You may eat and take any medicines before the exam.     If you need to change your appointment, please call outpatient scheduling at [phone]. [place] at the [facility] and [address] [facility] located on the first floor of [place] [address] through [facility] main entrance and turn immediately to your left. Patient's contact number:  [phone] (home)      Lexiscan Stress Test      Lexiscan (regadenoson injection) is a prescription drug given through an IV line that increases blood flow through the arteries of the heart during a cardiac nuclear stress test.     There are two parts to a Lexiscan stress test: the rest portion and the exercise portion. For the rest portion, a radioactive tracer is injected into your arm through the IV. After 30 to 60 minutes, the process of imaging will begin. A nuclear camera will be placed on your chest area and images are taken for the next 15 to 20 minutes. For the exercise portion, a nurse will attach EKG electrodes to your chest to monitor your heart rate. The drug Verner Yesenia is administered to simulate stress on the heart.   Your

## 2020-07-09 ENCOUNTER — HOSPITAL ENCOUNTER (OUTPATIENT)
Dept: NON INVASIVE DIAGNOSTICS | Age: 70
Discharge: HOME | End: 2020-07-09
Payer: MEDICARE

## 2020-07-09 ENCOUNTER — HOSPITAL ENCOUNTER (OUTPATIENT)
Dept: NUCLEAR MEDICINE | Age: 70
Discharge: HOME | End: 2020-07-11
Payer: MEDICARE

## 2020-07-09 ENCOUNTER — HOSPITAL ENCOUNTER (OUTPATIENT)
Dept: VASCULAR LAB | Age: 70
Discharge: HOME | End: 2020-07-09
Payer: MEDICARE

## 2020-07-09 LAB
LV EF: 58 %
LVEF MODALITY: NORMAL

## 2020-07-09 PROCEDURE — 93017 CV STRESS TEST TRACING ONLY: CPT

## 2020-07-09 PROCEDURE — A9500 TC99M SESTAMIBI: HCPCS | Performed by: NURSE PRACTITIONER

## 2020-07-09 PROCEDURE — 93306 TTE W/DOPPLER COMPLETE: CPT

## 2020-07-09 PROCEDURE — 3430000000 HC RX DIAGNOSTIC RADIOPHARMACEUTICAL: Performed by: NURSE PRACTITIONER

## 2020-07-09 PROCEDURE — 93970 EXTREMITY STUDY: CPT

## 2020-07-09 PROCEDURE — 6360000002 HC RX W HCPCS: Performed by: INTERNAL MEDICINE

## 2020-07-09 RX ADMIN — REGADENOSON 0.4 MG: 0.08 INJECTION, SOLUTION INTRAVENOUS at 12:14

## 2020-07-09 RX ADMIN — TETRAKIS(2-METHOXYISOBUTYLISOCYANIDE)COPPER(I) TETRAFLUOROBORATE 10 MILLICURIE: 1 INJECTION, POWDER, LYOPHILIZED, FOR SOLUTION INTRAVENOUS at 13:43

## 2020-07-09 RX ADMIN — TETRAKIS(2-METHOXYISOBUTYLISOCYANIDE)COPPER(I) TETRAFLUOROBORATE 30 MILLICURIE: 1 INJECTION, POWDER, LYOPHILIZED, FOR SOLUTION INTRAVENOUS at 13:43

## 2020-07-10 LAB
LV EF: 36 %
LVEF MODALITY: NORMAL

## 2020-07-23 ENCOUNTER — TELEPHONE (OUTPATIENT)
Dept: CARDIOLOGY | Age: 70
End: 2020-07-23

## 2020-07-23 ENCOUNTER — OFFICE VISIT (OUTPATIENT)
Dept: CARDIOLOGY | Age: 70
End: 2020-07-23
Payer: MEDICARE

## 2020-07-23 VITALS
HEART RATE: 114 BPM | HEIGHT: 71 IN | BODY MASS INDEX: 36.4 KG/M2 | WEIGHT: 260 LBS | DIASTOLIC BLOOD PRESSURE: 72 MMHG | OXYGEN SATURATION: 97 % | SYSTOLIC BLOOD PRESSURE: 114 MMHG

## 2020-07-23 PROBLEM — I48.91 ATRIAL FIBRILLATION (HCC): Status: ACTIVE | Noted: 2020-07-23

## 2020-07-23 PROCEDURE — 93000 ELECTROCARDIOGRAM COMPLETE: CPT | Performed by: NURSE PRACTITIONER

## 2020-07-23 PROCEDURE — 99213 OFFICE O/P EST LOW 20 MIN: CPT | Performed by: NURSE PRACTITIONER

## 2020-07-23 RX ORDER — NEBIVOLOL 10 MG/1
10 TABLET ORAL 2 TIMES DAILY
Qty: 180 TABLET | Refills: 3 | Status: SHIPPED | OUTPATIENT
Start: 2020-07-23 | End: 2021-01-25

## 2020-07-23 RX ORDER — NITROGLYCERIN 0.4 MG/1
0.4 TABLET SUBLINGUAL EVERY 5 MIN PRN
Qty: 25 TABLET | Refills: 3 | Status: SHIPPED | OUTPATIENT
Start: 2020-07-23

## 2020-07-23 RX ORDER — NEBIVOLOL 10 MG/1
10 TABLET ORAL 2 TIMES DAILY
Qty: 180 TABLET | Refills: 3
Start: 2020-07-23 | End: 2020-07-23

## 2020-07-23 ASSESSMENT — ENCOUNTER SYMPTOMS
BACK PAIN: 1
WHEEZING: 0
FACIAL SWELLING: 0
EYE DISCHARGE: 0
TROUBLE SWALLOWING: 0
ABDOMINAL PAIN: 0
EYE REDNESS: 0
VOMITING: 0
BLOOD IN STOOL: 0
ABDOMINAL DISTENTION: 0
SHORTNESS OF BREATH: 0
COUGH: 0
NAUSEA: 0
COLOR CHANGE: 0

## 2020-07-23 NOTE — TELEPHONE ENCOUNTER
Called patient's wife per Valeria Alford CNP after patient's appt this am to verify how he was taking his bystolic-she stated that he was taking 10mg 2 times a Maxcine Jurgen stated for him to start taking 20mg in the am and 10mg in pm but to watch his BP and if the systolic goes below 494 to give us a call back-she voiced understanding. [address] [facility]

## 2020-07-23 NOTE — PROGRESS NOTES
[address] [place] [address] Cardiology  [address]  [address]  Phone: [phone]  Fax: [phone]    OFFICE VISIT:  2020    Gilda Sands - : 1950    Reason For Visit:  Chicho Fox is a 65 y.o. male who is here for Follow-up (edema Left leg); Coronary Artery Disease; and Atrial Fibrillation      HPI    Mr. Carolyn Le is a 65 y.o. male with history of coronary artery disease, hypertension, hyperlipidemia, DVT (left leg), former nicotine dependence, and a family history of CAD who presents with the chief complaint of results follow-up. She continues to have no symptoms. He does have left lower extremity edema that was found to have a chronic DVT. 2 weeks ago he had bilateral eye surgery due to a retinal detachment of right eye and a retinal tear of left eye. Patient states he has not had to stop Eliquis nor has he missed a dose. Chicho Fox has no exertional chest pain, pressure, burning, tightness or squeezing. No symptomatic tachy- or bryant-arrhythmia. No lightheadedness, dizziness, or syncope. No numbness or weakness to suggest cerebrovascular accident or transient ischemic attack. he denies signs of bleeding. Reports no shortness of breath. He denies orthopnea or paroxysmal nocturnal dyspnea. he is sleeping on 2 pillow at night. he has been compliant with his medications. his BP has been controlled at home. he reports no activity change. PCP follows lipids and labs. Noe Goncalves MD is PCP.   Gilda Sands has the following history as recorded in [facility]:    Patient Active Problem List    Diagnosis Date Noted    Atrial fibrillation [facility]) 2020    Exertional chest pain 08/10/2015    History of coronary artery stent placement 08/10/2015    History of placement of stent in LAD coronary artery 2014    Hyperlipidemia 10/01/2012    Benign essential HTN 10/01/2012    Obstructive sleep apnea 10/01/2012    Polycythemia 10/01/2012    CAD (coronary artery disease) Genitourinary: Negative for dysuria and hematuria. Musculoskeletal: Positive for back pain. Negative for gait problem. Skin: Negative for color change, pallor and rash. Neurological: Negative for dizziness, syncope, facial asymmetry and light-headedness. Hematological: Does not bruise/bleed easily. Psychiatric/Behavioral: Negative for behavioral problems and confusion. All other systems reviewed and are negative. Objective  Vital Signs - /72   Pulse 114   Ht 5' 11\" (1.803 m)   Wt 260 lb (117.9 kg)   SpO2 97%   BMI 36.26 kg/m²   Physical Exam  Vitals signs and nursing note reviewed. Constitutional:       Appearance: He is well-developed. He is obese. HENT:      Head: Normocephalic and atraumatic. Right Ear: External ear normal.      Left Ear: External ear normal.      Nose: Nose normal.   Eyes:      General:         Right eye: No discharge. Left eye: No discharge. Pupils: Pupils are equal, round, and reactive to light. Neck:      Musculoskeletal: Normal range of motion. No edema. Vascular: No carotid bruit or JVD. Trachea: No tracheal deviation. Cardiovascular:      Rate and Rhythm: Normal rate. Rhythm irregular. Heart sounds: Normal heart sounds. No murmur. No friction rub. No gallop. Pulmonary:      Effort: Pulmonary effort is normal. No respiratory distress. Breath sounds: No wheezing, rhonchi or rales. Abdominal:      General: Bowel sounds are normal. There is no distension. Palpations: Abdomen is soft. Tenderness: There is no abdominal tenderness. Musculoskeletal: Normal range of motion. Right lower leg: Edema (mild) present. Left lower leg: Edema (moderate) present. Skin:     General: Skin is warm and dry. Capillary Refill: Capillary refill takes less than 2 seconds. Findings: No rash. Neurological:      Mental Status: He is alert and oriented to person, place, and time.    Psychiatric: Behavior: Behavior normal.         Judgment: Judgment normal.         Cardiac data:    ECG 07/23/20  Atrial fibrillation with nonspecific T wave abnormalities, 114 bpm    QTc 0.430 ms    1/2010 Jake Neat, cath EF 55%, LMCA 15%, LAD 99%, diagonal 40%, PTCA with drug-eluting stents x2 to LAD  8/6/2015  SE negative for myocardial ischemia  7/9/2020 TTE mild LVH, significant diastolic dysfunction, mild MR, mild TR, estimated EF 55 to 60%  7/9/2020 Georgina there is no ischemia, calculated EF 36% (normal on 2D echo)    Other data:  7/9/2020 venous study of lower extremity evidence of chronic partially occlusive DVT in the left lower extremity involving the popliteal, peroneal, and posterior tibial veins. There is no evidence of superficial thrombophlebitis of the bilateral lower extremities, no evidence of DVT in the right lower extremity    Assessment, Recommendations, & Plan:  65 y.o. male with      Diagnosis Orders   1. Atrial fibrillation, unspecified type ([address]     2. Coronary artery disease involving native coronary artery of native heart without angina pectoris  EKG 12 lead   3. Benign essential HTN     4. Mixed hyperlipidemia     5. Chronic deep vein thrombosis (DVT) of left lower extremity, unspecified vein (HCC)         1. Atrial fibrillation-patient continues to be asymptomatic. He has not missed a dose of his Eliquis. Discussed with patient cardioversion and he is agreeable. We will also increase Bystolic to twice daily to help better rate control. We will check with Dr. Valentino Sayers, retinal surgeon to make sure it is okay to do procedure.     [id number] Score for Atrial Fibrillation Stroke Risk   Risk   Factors  Component Value   C CHF No 0   H HTN Yes 1   A2 Age >= 76 No,  (71 y.o.) 0   D DM No 0   S2 Prior Stroke/TIA No 0   V Vascular Disease No 0   A Age 74-69 Yes,  (71 y.o.) 1   Sc Sex male 0    [id number]  Score  2   Score last updated 6/23/20 68:53 AM CDT    Click here for a link to the UpToDate guideline \"Atrial Fibrillation: Anticoagulation therapy to prevent embolization    Disclaimer: Risk Score calculation is dependent on accuracy of patient problem list and past encounter diagnosis. 2.  CAD- negative Lexiscan. Patient is on beta-blocker, HCTZ, calcium channel blocker, ARB, and statin therapy    3. Hypertension-blood pressure today 114/72. No changes made. 4.  Hyperlipidemia-patient is statin intolerant this is managed by PCP. 5.  Chronic DVT-we will refer to vascular surgery for further recommendations. Orders Placed This Encounter   Procedures    EKG 12 lead     Order Specific Question:   Reason for Exam?     Answer:   Coronary artery disease     No follow-ups on file. Call with any questionsor concerns  Follow up with Lefty Schultz MD for non cardiac problems  Report any new problems  Cardiovascular Fitness-Exercise as tolerated. Strive for 15 minutes of exercise most days of the week. Cardiac / HealthyDiet  Continue current medications as directed  Continue plan of treatment  It is always recommended that you bring your medicationsbottles with you to each visit - this is for your safety! Please do not hesitate to contact me for any questions or concerns. Sincerely yours,    HUNTER Smart    This dictation was generated by voice recognition computer software. Although all attempts are made to edit dictation for accuracy, there may be errors in the transcription that are not intended.

## 2020-07-23 NOTE — PATIENT INSTRUCTIONS
Orders Placed This Encounter   Procedures    EKG 12 lead     Order Specific Question:   Reason for Exam?     Answer:   Coronary artery disease     Return in about 6 months (around 1/23/2021). Call with any questionsor concerns  Follow up with Lars Vann MD for non cardiac problems  Report any new problems  Cardiovascular Fitness-Exercise as tolerated. Strive for 15 minutes of exercise most days of the week. Cardiac / HealthyDiet  Continue current medications as directed  Continue plan of treatment  It is always recommended that you bring your medicationsbottles with you to each visit - this is for your safety! Atrial Fibrillation     Definition   Atrial fibrillation is an abnormal heart rhythm. The heart's electrical system normally sends regularly spaced signals telling the heart muscle to beat. The heart has two upper chambers, called atria, and two lower chambers, called ventricles. Each signal starts in the atria and travels to the rest of the heart. In atrial fibrillation, the electrical signals from the atria are fast and irregular. The atria quiver, rather than contract. Some signals do not reach the ventricles and the ventricles continue pumping, usually irregularly and sometimes rapidly. This uncoordinated rhythm can reduce the heart's efficiency at pumping blood out to the body. Blood left in the heart chambers can form clots. These clots may sometimes break away, travel to the brain, and cause a stroke . Atrial Fibrillation        [address] [place] [address].   Causes   In most cases, atrial fibrillation is due to an existing heart condition. But atrial fibrillation can occur in people with no structural heart problems. A thyroid disorder or other condition may cause the abnormal rhythm. In some cases, the cause is unknown.    Risk Factors   Risk factors include:   · Cardiovascular diseases:   ¨ High blood pressure   ¨ Coronary artery disease   ¨ Congestive heart failure least 6 weeks prior to trying to get your heart back into a normal rhythm. If you are on Coumadin, you will need weekly blood checks to monitor your INR. You will need to keep your INR between 2.0-2.5 or 2.0 and 3.0 as your doctor's directions. Cardioversion   Cardioversion is a procedure that uses an electrical current or drugs to help normalize the heart rhythm. You will be in the hospital approximately 2-3 days to start antiarrhythmics (to keep your heart in rhythm) and shock to get you back in a normal rhythm.

## 2020-07-24 ENCOUNTER — PRE-PROCEDURE TELEPHONE (OUTPATIENT)
Dept: CARDIOLOGY | Age: 70
End: 2020-07-24

## 2020-07-31 ENCOUNTER — PATIENT MESSAGE (OUTPATIENT)
Dept: CARDIOLOGY | Age: 70
End: 2020-07-31

## 2020-07-31 ENCOUNTER — TELEPHONE (OUTPATIENT)
Dept: CARDIOLOGY | Age: 70
End: 2020-07-31

## 2020-07-31 NOTE — TELEPHONE ENCOUNTER
----- Message from Elizabeth Hoff MA sent at 7/27/2020  8:13 AM CDT -----  Regarding: FW: schedule dccv    ----- Message -----  From: HUNTER Avelar CNP  Sent: 7/24/2020  11:44 AM CDT  To: Elizabeth Hoff MA  Subject: schedule dccv                                    Jess owens     Will you schedule patient for dccv with Dr. Bobby Wang please? He has been on eliquis since 6/23. I talked with retinal group who says it is okay for procedure as long as he does not get nitrous oxide for anesthesia.      Thank you

## 2020-07-31 NOTE — TELEPHONE ENCOUNTER
Attempted to return patient's call on 8/3/2020 and again today without returned phone call. Evans Yee      From: Marshall Miguel  To: HUNTER Viera CNP  Sent: 7/31/2020  1:05 PM CDT  Subject: Prescription Question    You requested that Bystolic be doubled for morning dosage and to report back if blood pressure drops below 105. We have called but no one has returned our call so I have been continuing with only 1 Bystolic in the morning and 1 at night.     7/23 7:45 pm 107/71 pulse 67  7/24 2:00 pm 108/83 pulse 89  7/24 6:00 pm 89/68 p 68  7/25 12 noon 103/72 p7  7/27 12:30 pm 115/86 p92  7/27 2:30 pm 105/72 p79  7/28 6:15 pm 119/86 p83  7/31 12:30 pm 99/74 p104    Should I come to office and have it rechecked?     [phone]

## 2020-08-05 ENCOUNTER — TELEPHONE (OUTPATIENT)
Dept: CARDIOLOGY | Age: 70
End: 2020-08-05

## 2020-08-05 RX ORDER — AMLODIPINE BESYLATE 5 MG/1
TABLET ORAL
Qty: 90 TABLET | Refills: 3
Start: 2020-08-05 | End: 2020-08-31

## 2020-08-05 NOTE — TELEPHONE ENCOUNTER
Return patient call discussed blood pressure and pulse readings. Patient is scheduled for cardioversion 8/13/2020. Advised patient to discontinue morning dose of amlodipine and continue 5 mg at night. Continue taking Bystolic twice daily. Patient will call back Monday with blood pressure readings. May need to stop amlodipine altogether.

## 2020-08-11 NOTE — TELEPHONE ENCOUNTER
Patient called to be set up for a PCI&ICD. Patient is scheduled for 08/19/20 @ 10:00 with a 8:00 arrival time, patient aware. Patient is aware they need to have COVID testing done prior to procedure and will come Saturday before noon to have that testing completed. Patient aware not to eat or drink after midnight and will take morning medications with a small sip of water. Jesus Malone in cath lab notified to put on schedule.

## 2020-08-12 ENCOUNTER — TELEPHONE (OUTPATIENT)
Dept: VASCULAR SURGERY | Age: 70
End: 2020-08-12

## 2020-08-12 NOTE — TELEPHONE ENCOUNTER
I sw the pt's spouse to let her know she will be able to accompany her  to his appt. We ask for her to follow mask restrictions. Mrs. Quiñonez Dress voiced understanding and is aware.

## 2020-08-13 ENCOUNTER — OFFICE VISIT (OUTPATIENT)
Dept: VASCULAR SURGERY | Age: 70
End: 2020-08-13
Payer: MEDICARE

## 2020-08-13 VITALS
HEIGHT: 71 IN | SYSTOLIC BLOOD PRESSURE: 139 MMHG | DIASTOLIC BLOOD PRESSURE: 69 MMHG | RESPIRATION RATE: 16 BRPM | WEIGHT: 260 LBS | TEMPERATURE: 98.9 F | BODY MASS INDEX: 36.4 KG/M2

## 2020-08-13 PROCEDURE — 99204 OFFICE O/P NEW MOD 45 MIN: CPT | Performed by: PHYSICIAN ASSISTANT

## 2020-08-13 NOTE — PROGRESS NOTES
losartan (COZAAR) 100 MG tablet Take 100 mg by mouth daily      apixaban (ELIQUIS) 5 MG TABS tablet Take 1 tablet by mouth 2 times daily 180 tablet 1    triamterene-hydrochlorothiazide (MAXZIDE-25) 37.5-25 MG per tablet Take 1 tablet by mouth daily Indications: Patient is taking once a day but is 75/50 Mil 90 tablet 3    furosemide (LASIX) 20 MG tablet Take 1 tablet by mouth daily 90 tablet 3    Multiple Vitamins-Minerals (THERAPEUTIC MULTIVITAMIN-MINERALS) tablet Take 1 tablet by mouth daily      tamsulosin (FLOMAX) 0.4 MG capsule Take 0.4 mg by mouth 2 times daily      finasteride (PROSCAR) 5 MG tablet Take 5 mg by mouth daily      VENTOLIN  (90 Base) MCG/ACT inhaler INHALE 2 PUFFS BY MOUTH 4 TIMES A DAY AS NEEDED  0    simvastatin (ZOCOR) 10 MG tablet Take 1 tablet by mouth nightly 90 tablet 3    potassium chloride (KLOR-CON M) 20 MEQ extended release tablet Take 1 tablet by mouth 2 times daily 180 tablet 3    Sennosides (SENOKOT PO) Take 5 mg by mouth daily       allopurinol (ZYLOPRIM) 300 MG tablet Take 300 mg by mouth daily      esomeprazole (NEXIUM) 40 MG capsule Take 40 mg by mouth 2 times daily       multivitamin (THERAGRAN) per tablet Take 1 tablet by mouth daily.  Loratadine (CLARITIN) 10 MG CAPS Take 1 tablet by mouth daily.  Calcium Carbonate-Vitamin D (CALCIUM + D) 600-200 MG-UNIT TABS Take 1 tablet by mouth 2 times daily       aspirin 81 MG EC tablet Take 81 mg by mouth daily. No current facility-administered medications for this visit. Allergies: Patient has no known allergies. Past Medical History:   Diagnosis Date    Atrial fibrillation ([facility] [address] 7/23/2020    Benign essential HTN 10/1/2012    CAD (coronary artery disease) 9/28/2012    History of placement of stent in LAD coronary artery 11/17/2014    11/26/10 stents x 2 to mid LAD    Hyperlipidemia 10/1/2012    Dr. Yane Stein follows lipids.     Obstructive sleep apnea 10/1/2012    Polycythemia 10/1/2012    S/P PTCA (percutaneous transluminal coronary angioplasty) 2012     Past Surgical History:   Procedure Laterality Date    BACK SURGERY      CORONARY ANGIOPLASTY WITH STENT PLACEMENT  2010    EYE SURGERY  2020    retina came loose    KNEE SURGERY      Jonathan    PTCA       Family History   Problem Relation Age of Onset    Heart Disease Mother     High Blood Pressure Mother     High Blood Pressure Father     Heart Disease Maternal Uncle      Social History     Tobacco Use    Smoking status: Former Smoker     Types: Cigarettes     Last attempt to quit: 10/1/1977     Years since quittin.8    Smokeless tobacco: Never Used   Substance Use Topics    Alcohol use: Yes     Comment: rarely       Old records have been obtained from the referring. These records have been reviewed and summarized. Review of Systems    Constitutional - no significant activity change, appetite change, or unexpected weight change. No fever or chills. No diaphoresis or significant fatigue. HENT - no significant rhinorrhea or epistaxis. No tinnitus or significant hearing loss. Eyes - no sudden vision change or amaurosis. Respiratory - no significant shortness of breath, wheezing, or stridor. No apnea, cough, or chest tightness associated with shortness of breath. Cardiovascular - no chest pain, syncope, or significant dizziness. No palpitations. No claudication. Left leg edema. (See HPI)  Gastrointestinal - no abdominal swelling or pain. No blood in stool. No severe constipation, diarrhea, nausea, or vomiting. Genitourinary - No difficulty urinating, dysuria, frequency, or urgency. No flank pain or hematuria. Musculoskeletal - no back pain, gait disturbance, or myalgia. Skin - no color change, rash, pallor, or new wound. Neurologic - no dizziness, facial asymmetry, or light headedness. No seizures. No speech difficulty or lateralizing weakness.   Hematologic - no easy bruising or excessive bleeding. Psychiatric - no severe anxiety or nervousness. No confusion. All other review of systems are negative. Physical Exam    /69 (Site: Left Upper Arm)   Temp 98.9 °F (37.2 °C) (Temporal)   Resp 16   Ht 5' 11\" (1.803 m)   Wt 260 lb (117.9 kg)   BMI 36.26 kg/m²     Constitutional - well developed, well nourished. No diaphoresis or acute distress. HENT - head normocephalic. Right external ear canal appears normal.  Left external ear canal appears normal.  Septum appears midline. Eyes - conjunctiva normal.  EOMS normal.  No exudate. No icterus. Neck- ROM appears normal, no tracheal deviation. Cardiovascular - Regular rate and rhythm. Heart sounds are normal.  No murmur, rub, or gallop. Carotid pulses are 2+ to palpation bilaterally without bruit. Extremities - Radial and brachial pulses are 2+ to palpation bilaterally. DP and PT pulses palpable bilaterally. Left leg swelling noted. No erythema or signs of cellulitis. No cyanosis, clubbing, or significant edema. No signs atheroembolic event. Pulmonary - effort appears normal.  No respiratory distress. Lungs - Breath sounds normal. No wheezes or rales. GI - Abdomen - soft, non tender, bowel sounds X 4 quadrants. No guarding or rebound tenderness. No distension or palpable mass. Genitourinary - deferred. Musculoskeletal - ROM appears normal.   Neurologic - alert and oriented X 3. Physiologic. Skin - warm, dry, and intact. No rash, erythema, or pallor.   Psychiatric - mood, affect, and behavior appear normal.  Judgment and thought processes appear normal.    Venous Scan: 7/9/2020  Impression         Brena Sovereign is evidence of chronic partially occlusive deep vein thrombosis in     the left lower extremity involving the popliteal, peroneal, and posterior     tibial veins.     There is no evidence of superficial thrombophlebitis of the bilateral     lower extremities.    Brena Sovereign is no evidence of deep venous thrombosis (DVT) in the right lower     extremity.          Signature          ----------------------------------------------------------------     Electronically signed by Zachary James     physician) on 07/09/2020 03:42 PM     ----------------------------------------------------------------         Velocities are measured in cm/s ; Diameters are measured in mm         Right Lower Extremities DVT Study Measurements    Right 2D Measurements    +------------------------------------+----------+---------------+----------+    ! Location                            ! Visualized! Compressibility! Thrombosis! +------------------------------------+----------+---------------+----------+    ! Sapheno Femoral Junction            ! Yes       ! Yes            !None      !    +------------------------------------+----------+---------------+----------+    ! Common Femoral                      ! Yes       ! Yes            !None      !    +------------------------------------+----------+---------------+----------+    ! Prox Femoral                        ! Yes       ! Yes            !None      !    +------------------------------------+----------+---------------+----------+    ! Mid Femoral                         ! Yes       ! Yes            !None      !    +------------------------------------+----------+---------------+----------+    ! Dist Femoral                        ! Yes       ! Yes            !None      !    +------------------------------------+----------+---------------+----------+    ! Deep Femoral                        ! Yes       ! Yes            !None      !    +------------------------------------+----------+---------------+----------+    ! Popliteal                           ! Yes       ! Yes            !None      !    +------------------------------------+----------+---------------+----------+    ! SSV                                 ! Yes       ! Yes            !None      ! +------------------------------------+----------+---------------+----------+    ! Mid Femoral                         ! Yes       ! Yes            !None      !    +------------------------------------+----------+---------------+----------+    ! Dist Femoral                        ! Yes       ! Yes            !None      !    +------------------------------------+----------+---------------+----------+    ! Deep Femoral                        ! Yes       ! Yes            !None      !    +------------------------------------+----------+---------------+----------+    ! Popliteal                           ! Yes       !Partial        ! Chronic   !    +------------------------------------+----------+---------------+----------+    ! SSV                                 ! Yes       ! Yes            !None      !    +------------------------------------+----------+---------------+----------+    ! Gastroc                             ! Yes       ! Yes            !None      !    +------------------------------------+----------+---------------+----------+    ! PTV                                 !Partial   !Partial        ! Chronic   !    +------------------------------------+----------+---------------+----------+    ! GSV                                 ! Yes       ! Yes            !None      !    +------------------------------------+----------+---------------+----------+    ! ATV                                 !Partial   ! Yes            !None      !    +------------------------------------+----------+---------------+----------+    ! Peroneal                            !Partial   !Partial        ! Chronic   !    +------------------------------------+----------+---------------+----------+    ! Soleal                              !No        !               !          !    +------------------------------------+----------+---------------+----------+              Assessment      1.  Left leg DVT      Plan      Recommend he continue Eliquis 5 mg twice daily  Recommend leg elevation above the level of the heart  Recommend continue compression stockings/ace wrap daily  We will follow-up in December with a left leg venous scan or sooner if he develops increased swelling pain or redness

## 2020-08-15 ENCOUNTER — OFFICE VISIT (OUTPATIENT)
Age: 70
End: 2020-08-15

## 2020-08-15 VITALS — HEART RATE: 65 BPM | TEMPERATURE: 96.9 F | OXYGEN SATURATION: 92 %

## 2020-08-17 LAB — SARS-COV-2, NAA: NOT DETECTED

## 2020-08-19 ENCOUNTER — HOSPITAL ENCOUNTER (OUTPATIENT)
Dept: CARDIAC CATH/INVASIVE PROCEDURES | Age: 70
Discharge: HOME | End: 2020-08-19
Attending: INTERNAL MEDICINE | Admitting: INTERNAL MEDICINE
Payer: MEDICARE

## 2020-08-19 VITALS
HEART RATE: 57 BPM | RESPIRATION RATE: 21 BRPM | SYSTOLIC BLOOD PRESSURE: 137 MMHG | OXYGEN SATURATION: 99 % | DIASTOLIC BLOOD PRESSURE: 90 MMHG

## 2020-08-19 LAB
ANION GAP SERPL CALCULATED.3IONS-SCNC: 9 MMOL/L (ref 7–19)
BUN BLDV-MCNC: 25 MG/DL (ref 8–23)
CALCIUM SERPL-MCNC: 10.2 MG/DL (ref 8.8–10.2)
CHLORIDE BLD-SCNC: 105 MMOL/L (ref 98–111)
CO2: 29 MMOL/L (ref 22–29)
CREAT SERPL-MCNC: 1.3 MG/DL (ref 0.5–1.2)
EKG P AXIS: 72 DEGREES
EKG P AXIS: NORMAL DEGREES
EKG P-R INTERVAL: 186 MS
EKG P-R INTERVAL: NORMAL MS
EKG Q-T INTERVAL: 340 MS
EKG Q-T INTERVAL: 390 MS
EKG QRS DURATION: 84 MS
EKG QRS DURATION: 88 MS
EKG QTC CALCULATION (BAZETT): 412 MS
EKG QTC CALCULATION (BAZETT): 432 MS
EKG T AXIS: 69 DEGREES
EKG T AXIS: 81 DEGREES
GFR AFRICAN AMERICAN: >59
GFR NON-AFRICAN AMERICAN: 54
GLUCOSE BLD-MCNC: 101 MG/DL (ref 74–109)
HCT VFR BLD CALC: 53.3 % (ref 42–52)
HEMOGLOBIN: 17.5 G/DL (ref 14–18)
MCH RBC QN AUTO: 29.9 PG (ref 27–31)
MCHC RBC AUTO-ENTMCNC: 32.8 G/DL (ref 33–37)
MCV RBC AUTO: 91.1 FL (ref 80–94)
PDW BLD-RTO: 14.6 % (ref 11.5–14.5)
PLATELET # BLD: 178 K/UL (ref 130–400)
PMV BLD AUTO: 11.4 FL (ref 9.4–12.4)
POTASSIUM REFLEX MAGNESIUM: 4.4 MMOL/L (ref 3.5–5)
RBC # BLD: 5.85 M/UL (ref 4.7–6.1)
SODIUM BLD-SCNC: 143 MMOL/L (ref 136–145)
WBC # BLD: 6.7 K/UL (ref 4.8–10.8)

## 2020-08-19 PROCEDURE — 92960 CARDIOVERSION ELECTRIC EXT: CPT | Performed by: INTERNAL MEDICINE

## 2020-08-19 PROCEDURE — 85027 COMPLETE CBC AUTOMATED: CPT

## 2020-08-19 PROCEDURE — 99152 MOD SED SAME PHYS/QHP 5/>YRS: CPT

## 2020-08-19 PROCEDURE — 36415 COLL VENOUS BLD VENIPUNCTURE: CPT

## 2020-08-19 PROCEDURE — 99024 POSTOP FOLLOW-UP VISIT: CPT | Performed by: INTERNAL MEDICINE

## 2020-08-19 PROCEDURE — 92960 CARDIOVERSION ELECTRIC EXT: CPT

## 2020-08-19 PROCEDURE — 2580000003 HC RX 258: Performed by: INTERNAL MEDICINE

## 2020-08-19 PROCEDURE — 99152 MOD SED SAME PHYS/QHP 5/>YRS: CPT | Performed by: INTERNAL MEDICINE

## 2020-08-19 PROCEDURE — 80048 BASIC METABOLIC PNL TOTAL CA: CPT

## 2020-08-19 PROCEDURE — 6360000002 HC RX W HCPCS

## 2020-08-19 PROCEDURE — 93005 ELECTROCARDIOGRAM TRACING: CPT | Performed by: INTERNAL MEDICINE

## 2020-08-19 RX ORDER — SODIUM CHLORIDE 9 MG/ML
INJECTION, SOLUTION INTRAVENOUS CONTINUOUS
Status: DISCONTINUED | OUTPATIENT
Start: 2020-08-19 | End: 2020-08-19 | Stop reason: HOSPADM

## 2020-08-19 RX ADMIN — SODIUM CHLORIDE: 9 INJECTION, SOLUTION INTRAVENOUS at 08:57

## 2020-08-19 NOTE — PROGRESS NOTES
Patient and wife instructed on care post cardioversion. Given written instructions. Both stated understanding.

## 2020-08-19 NOTE — H&P
Office Visit     2020  [facility]   Tammy Bartlett, HUNTER - CNP   Nurse Practitioner Acute Care   Atrial fibrillation, unspecified type [facility]) +4 more   Dx   Follow-up   , Coronary Artery Disease , Atrial Fibrillation ; Referred by Aruna Nicolas MD   Reason for Visit    Progress Notes     Expand All Collapse All    [facility]  [address]  [address]  Phone: [phone]  Fax: [phone]     OFFICE VISIT:  2020     Juwan Mullen - : 1950     Reason For Visit:  Maria Luisa De León is a 65 y.o. male who is here for Follow-up (edema Left leg); Coronary Artery Disease; and Atrial Fibrillation       HPI     Mr. Khang Wright is a 65 y.o. male with history of coronary artery disease, hypertension, hyperlipidemia, DVT (left leg), former nicotine dependence, and a family history of CAD who presents with the chief complaint of results follow-up.     She continues to have no symptoms. He does have left lower extremity edema that was found to have a chronic DVT. 2 weeks ago he had bilateral eye surgery due to a retinal detachment of right eye and a retinal tear of left eye. Patient states he has not had to stop Eliquis nor has he missed a dose.     Maria Luisa De León has no exertional chest pain, pressure, burning, tightness or squeezing. No symptomatic tachy- or bryant-arrhythmia. No lightheadedness, dizziness, or syncope. No numbness or weakness to suggest cerebrovascular accident or transient ischemic attack. he denies signs of bleeding. Reports no shortness of breath. He denies orthopnea or paroxysmal nocturnal dyspnea. he is sleeping on 2 pillow at night. he has been compliant with his medications. his BP has been controlled at home. he reports no activity change.      PCP follows lipids and labs.          Aruna Nicolas MD is PCP.   Juwan Mullen has the following history as [facility] in [facility]:          Patient Active Problem List     Diagnosis Date Noted    Atrial fibrillation ([facility] [address] 2020    Exertional chest pain 08/10/2015    History of coronary artery stent placement 08/10/2015    History of placement of stent in LAD coronary artery 2014    Hyperlipidemia 10/01/2012    Benign essential HTN 10/01/2012    Obstructive sleep apnea 10/01/2012    Polycythemia 10/01/2012    CAD (coronary artery disease) 2012     Past Medical History        Past Medical History:   Diagnosis Date    Atrial fibrillation ([facility] [address] 2020    Benign essential HTN 10/1/2012    CAD (coronary artery disease) 2012    History of placement of stent in LAD coronary artery 2014     11/26/10 stents x 2 to mid LAD    Hyperlipidemia 10/1/2012     Dr. Amna Cullen follows lipids.  Obstructive sleep apnea 10/1/2012    Polycythemia 10/1/2012    S/P PTCA (percutaneous transluminal coronary angioplasty) 2012        Past Surgical History         Past Surgical History:   Procedure Laterality Date    BACK SURGERY        CORONARY ANGIOPLASTY WITH STENT PLACEMENT   2010    EYE SURGERY   2020     retina came loose    KNEE SURGERY         Jonathan    PTCA           Family History         Family History   Problem Relation Age of Onset    Heart Disease Mother      High Blood Pressure Mother      High Blood Pressure Father      Heart Disease Maternal Uncle          Social History            Tobacco Use    Smoking status: Former Smoker       Types: Cigarettes       Last attempt to quit: 10/1/1977       Years since quittin.8    Smokeless tobacco: Never Used   Substance Use Topics    Alcohol use:  Yes       Comment: rarely      Current Facility-Administered Medications          Current Outpatient Medications   Medication Sig Dispense Refill    losartan (COZAAR) 100 MG tablet Take 100 mg by mouth daily        amLODIPine (NORVASC) 5 MG tablet Take 5 mg by mouth 2 times daily        apixaban (ELIQUIS) 5 MG TABS tablet Take 1 tablet by mouth 2 times daily 180 tablet 1    triamterene-hydrochlorothiazide (MAXZIDE-25) 37.5-25 MG per tablet Take 1 tablet by mouth daily Indications: Patient is taking once a day but is 75/50 Mil 90 tablet 3    furosemide (LASIX) 20 MG tablet Take 1 tablet by mouth daily 90 tablet 3    amLODIPine (NORVASC) 5 MG tablet TAKE 1 TABLET BY MOUTH EVERY DAY (Patient taking differently: Take 5 mg by mouth 2 times daily ) 90 tablet 3    BYSTOLIC 10 MG tablet TAKE 1 TABLET BY MOUTH TWICE A  tablet 3    Multiple Vitamins-Minerals (THERAPEUTIC MULTIVITAMIN-MINERALS) tablet Take 1 tablet by mouth daily        tamsulosin (FLOMAX) 0.4 MG capsule Take 0.4 mg by mouth 2 times daily        finasteride (PROSCAR) 5 MG tablet Take 5 mg by mouth daily        VENTOLIN  (90 Base) MCG/ACT inhaler INHALE 2 PUFFS BY MOUTH 4 TIMES A DAY AS NEEDED   0    nitroGLYCERIN (NITROSTAT) 0.4 MG SL tablet Place 1 tablet under the tongue every 5 minutes as needed for Chest pain 25 tablet 3    simvastatin (ZOCOR) 10 MG tablet Take 1 tablet by mouth nightly 90 tablet 3    potassium chloride (KLOR-CON M) 20 MEQ extended release tablet Take 1 tablet by mouth 2 times daily 180 tablet 3    Sennosides (SENOKOT PO) Take 5 mg by mouth daily         allopurinol (ZYLOPRIM) 300 MG tablet Take 300 mg by mouth daily        esomeprazole (NEXIUM) 40 MG capsule Take 40 mg by mouth 2 times daily         multivitamin (THERAGRAN) per tablet Take 1 tablet by mouth daily.          Loratadine (CLARITIN) 10 MG CAPS Take 1 tablet by mouth daily.          Calcium Carbonate-Vitamin D (CALCIUM + D) 600-200 MG-UNIT TABS Take 1 tablet by mouth 2 times daily         aspirin 81 MG EC tablet Take 81 mg by mouth daily.          triamterene-hydrochlorothiazide (MAXZIDE) 75-50 MG per tablet Take 1 tablet by mouth daily          No current facility-administered medications for this visit.          Allergies: Patient has no known allergies.     Review of Systems  Review of Systems with patient cardioversion and he is agreeable. We will also increase Bystolic to twice daily to help better rate control. We will check with Dr. Duane Ramming, retinal surgeon to make sure it is okay to do procedure.     [id number] Score for Atrial Fibrillation Stroke Risk    Risk   Factors   Component Value   C CHF No 0   H HTN Yes 1   A2 Age >= 76 No,  (71 y.o.) 0   D DM No 0   S2 Prior Stroke/TIA No 0   V Vascular Disease No 0   A Age 74-69 Yes,  (71 y.o.) 1   Sc Sex male 0     [id number]  Score   2   Score last updated 6/23/20 83:43 AM CDT     Click here for a link to the UpToDate guideline \"Atrial Fibrillation: Anticoagulation therapy to prevent embolization     Disclaimer: Risk Score calculation is dependent on accuracy of patient problem list and past encounter diagnosis.        2. CAD- negative Lexiscan. Patient is on beta-blocker, HCTZ, calcium channel blocker, ARB, and statin therapy     3. Hypertension-blood pressure today 114/72. No changes made.     4. Hyperlipidemia-patient is statin intolerant this is managed by PCP.     5. Chronic DVT-we will refer to vascular surgery for further recommendations.              Orders Placed This Encounter   Procedures    EKG 12 lead       Order Specific Question:   Reason for Exam?       Answer:   Coronary artery disease     No follow-ups on file. Call with any questionsor concerns  Follow up with Tiara Diaz MD for non cardiac problems  Report any new problems  Cardiovascular Fitness-Exercise as tolerated. Strive for 15 minutes of exercise most days of the week. Cardiac / HealthyDiet  Continue current medications as directed  Continue plan of treatment  It is always recommended that you bring your medicationsbottles with you to each visit - this is for your safety!      Please do not hesitate to contact me for any questions or concerns.     Sincerely yours,     Claudetta Ernst, APRN     This dictation was generated by voice recognition computer software. Although all attempts are made to edit dictation for accuracy, there may be errors in the transcription that are not intended.            Admitted for elective cardioversion advised indications alternatives benefits and risk patient agreeable. ASA score: 3  Airway score: Mallampati class 3  Patient is acceptable candidate for moderate IV conscious sedation.

## 2020-08-31 RX ORDER — AMLODIPINE BESYLATE 5 MG/1
TABLET ORAL
Qty: 180 TABLET | Refills: 3 | Status: SHIPPED | OUTPATIENT
Start: 2020-08-31 | End: 2020-09-17

## 2020-09-17 ENCOUNTER — OFFICE VISIT (OUTPATIENT)
Dept: CARDIOLOGY | Age: 70
End: 2020-09-17
Payer: MEDICARE

## 2020-09-17 VITALS
WEIGHT: 257 LBS | OXYGEN SATURATION: 98 % | BODY MASS INDEX: 35.98 KG/M2 | DIASTOLIC BLOOD PRESSURE: 82 MMHG | HEIGHT: 71 IN | SYSTOLIC BLOOD PRESSURE: 120 MMHG | HEART RATE: 60 BPM

## 2020-09-17 PROCEDURE — 93000 ELECTROCARDIOGRAM COMPLETE: CPT | Performed by: NURSE PRACTITIONER

## 2020-09-17 PROCEDURE — 99213 OFFICE O/P EST LOW 20 MIN: CPT | Performed by: NURSE PRACTITIONER

## 2020-09-17 RX ORDER — AMLODIPINE BESYLATE 5 MG/1
5 TABLET ORAL DAILY
COMMUNITY

## 2020-09-17 ASSESSMENT — ENCOUNTER SYMPTOMS
SHORTNESS OF BREATH: 0
COLOR CHANGE: 0
NAUSEA: 0
ABDOMINAL DISTENTION: 0
EYE REDNESS: 0
TROUBLE SWALLOWING: 0
BACK PAIN: 1
FACIAL SWELLING: 0
VOMITING: 0
EYE DISCHARGE: 0
BLOOD IN STOOL: 0
COUGH: 0
ABDOMINAL PAIN: 0
WHEEZING: 0

## 2020-09-17 NOTE — PROGRESS NOTES
Arthritis     Atrial fibrillation ([facility] [address] 2020    Benign essential HTN 10/1/2012    CAD (coronary artery disease) 2012    History of placement of stent in LAD coronary artery 2014    11/26/10 stents x 2 to mid LAD    Hx of blood clots     Hyperlipidemia 10/1/2012    Dr. Ramiro Manzano follows lipids.     Obstructive sleep apnea 10/1/2012    Polycythemia 10/1/2012    S/P PTCA (percutaneous transluminal coronary angioplasty) 2012     Past Surgical History:   Procedure Laterality Date    BACK SURGERY      CATARACT REMOVAL      both eyes    CORONARY ANGIOPLASTY WITH STENT PLACEMENT  2010    EYE SURGERY  2020    retina came loose    KNEE SURGERY      Jonathan    PTCA      RETINAL DETACHMENT SURGERY       Family History   Problem Relation Age of Onset    Heart Disease Mother     High Blood Pressure Mother     High Blood Pressure Father     Heart Disease Maternal Uncle      Social History     Tobacco Use    Smoking status: Former Smoker     Types: Cigarettes     Last attempt to quit: 10/1/1977     Years since quittin.9    Smokeless tobacco: Never Used   Substance Use Topics    Alcohol use: Yes     Comment: rarely      Current Outpatient Medications   Medication Sig Dispense Refill    amLODIPine (NORVASC) 5 MG tablet Take 5 mg by mouth daily      nitroGLYCERIN (NITROSTAT) 0.4 MG SL tablet Place 1 tablet under the tongue every 5 minutes as needed for Chest pain 25 tablet 3    nebivolol (BYSTOLIC) 10 MG tablet Take 1 tablet by mouth 2 times daily 180 tablet 3    losartan (COZAAR) 100 MG tablet Take 100 mg by mouth daily      apixaban (ELIQUIS) 5 MG TABS tablet Take 1 tablet by mouth 2 times daily 180 tablet 1    triamterene-hydrochlorothiazide (MAXZIDE-25) 37.5-25 MG per tablet Take 1 tablet by mouth daily Indications: Patient is taking once a day but is 75/50 Mil (Patient taking differently: Take 1 tablet by mouth daily ) 90 tablet 3    furosemide (LASIX) 20 MG tablet Take 1 tablet by mouth daily (Patient taking differently: Take 20 mg by mouth daily as needed ) 90 tablet 3    Multiple Vitamins-Minerals (THERAPEUTIC MULTIVITAMIN-MINERALS) tablet Take 1 tablet by mouth daily      tamsulosin (FLOMAX) 0.4 MG capsule Take 0.4 mg by mouth 2 times daily      finasteride (PROSCAR) 5 MG tablet Take 5 mg by mouth daily      VENTOLIN  (90 Base) MCG/ACT inhaler INHALE 2 PUFFS BY MOUTH 4 TIMES A DAY AS NEEDED  0    simvastatin (ZOCOR) 10 MG tablet Take 1 tablet by mouth nightly 90 tablet 3    potassium chloride (KLOR-CON M) 20 MEQ extended release tablet Take 1 tablet by mouth 2 times daily 180 tablet 3    Sennosides (SENOKOT PO) Take 5 mg by mouth daily       allopurinol (ZYLOPRIM) 300 MG tablet Take 300 mg by mouth daily      esomeprazole (NEXIUM) 40 MG capsule Take 40 mg by mouth 2 times daily       Loratadine (CLARITIN) 10 MG CAPS Take 1 tablet by mouth daily as needed       Calcium Carbonate-Vitamin D (CALCIUM + D) 600-200 MG-UNIT TABS Take 1 tablet by mouth 2 times daily       aspirin 81 MG EC tablet Take 81 mg by mouth daily. No current facility-administered medications for this visit. Allergies: Patient has no known allergies. Review of Systems  Review of Systems   Constitutional: Negative for activity change, diaphoresis, fatigue, fever and unexpected weight change. HENT: Negative for facial swelling, nosebleeds and trouble swallowing. Eyes: Negative for discharge, redness and visual disturbance. Respiratory: Negative for cough, shortness of breath and wheezing. Cardiovascular: Positive for leg swelling. Negative for chest pain and palpitations. Gastrointestinal: Negative for abdominal distention, abdominal pain, blood in stool, nausea and vomiting. Endocrine: Negative for cold intolerance and heat intolerance. Genitourinary: Negative for dysuria and hematuria. Musculoskeletal: Positive for back pain. Negative for gait problem.    Skin: Negative for color change, pallor and rash. Neurological: Negative for dizziness, syncope, facial asymmetry and light-headedness. Hematological: Does not bruise/bleed easily. Psychiatric/Behavioral: Negative for behavioral problems and confusion. All other systems reviewed and are negative. Objective  Vital Signs - /82   Pulse 60   Ht 5' 11\" (1.803 m)   Wt 257 lb (116.6 kg)   SpO2 98%   BMI 35.84 kg/m²   Physical Exam  Vitals signs and nursing note reviewed. Constitutional:       Appearance: He is well-developed. He is obese. HENT:      Head: Normocephalic and atraumatic. Right Ear: External ear normal.      Left Ear: External ear normal.      Nose: Nose normal.   Eyes:      General:         Right eye: No discharge. Left eye: No discharge. Pupils: Pupils are equal, round, and reactive to light. Neck:      Musculoskeletal: Normal range of motion. No edema. Vascular: No carotid bruit or JVD. Trachea: No tracheal deviation. Cardiovascular:      Rate and Rhythm: Normal rate and regular rhythm. Heart sounds: Normal heart sounds. No murmur. No friction rub. No gallop. Pulmonary:      Effort: Pulmonary effort is normal. No respiratory distress. Breath sounds: No wheezing, rhonchi or rales. Abdominal:      General: Bowel sounds are normal. There is no distension. Palpations: Abdomen is soft. Tenderness: There is no abdominal tenderness. Musculoskeletal: Normal range of motion. Right lower leg: Edema (mild) present. Left lower leg: Edema (moderate) present. Skin:     General: Skin is warm and dry. Capillary Refill: Capillary refill takes less than 2 seconds. Findings: No rash. Neurological:      Mental Status: He is alert and oriented to person, place, and time.    Psychiatric:         Behavior: Behavior normal.         Judgment: Judgment normal.         Cardiac data:    ECG 09/17/20  Sinus rhythm with nonspecific ST-T wave abnormalities, 60 bpm    QTc 0.406 ms    1/2010 Alvin Ifeanyi, cath EF 55%, LMCA 15%, LAD 99%, diagonal 40%, PTCA with drug-eluting stents x2 to LAD  8/6/2015  SE negative for myocardial ischemia  7/9/2020 TTE mild LVH, significant diastolic dysfunction, mild MR, mild TR, estimated EF 55 to 60%  7/9/2020 Georgina there is no ischemia, calculated EF 36% (normal on 2D echo)  8/20/2020 DCCV successful cardioversion from atrial fibrillation to normal sinus rhythm. Other data:  7/9/2020 venous study of lower extremity evidence of chronic partially occlusive DVT in the left lower extremity involving the popliteal, peroneal, and posterior tibial veins. There is no evidence of superficial thrombophlebitis of the bilateral lower extremities, no evidence of DVT in the right lower extremity    Assessment, Recommendations, & Plan:  65 y.o. male with      Diagnosis Orders   1. Paroxysmal atrial fibrillation (HCC)  EKG 12 lead   2. Coronary artery disease involving native coronary artery of native heart without angina pectoris     3. Benign essential HTN     4. Mixed hyperlipidemia     5. Chronic deep vein thrombosis (DVT) of popliteal vein of left lower extremity (HCC)         1. Proximal atrial fibrillation-patient is sinus rhythm today. He is on Eliquis and Bystolic. Continue current medication regimen. [id number] Score for Atrial Fibrillation Stroke Risk   Risk   Factors  Component Value   C CHF No 0   H HTN Yes 1   A2 Age >= 76 No,  (71 y.o.) 0   D DM No 0   S2 Prior Stroke/TIA No 0   V Vascular Disease No 0   A Age 74-69 Yes,  (71 y.o.) 1   Sc Sex male 0    [id number]  Score  2   Score last updated 6/23/20 54:41 AM CDT    Click here for a link to the UpToDate guideline \"Atrial Fibrillation: Anticoagulation therapy to prevent embolization    Disclaimer: Risk Score calculation is dependent on accuracy of patient problem list and past encounter diagnosis. 2.  CAD-stable.  patient is on beta-blocker, HCTZ, calcium channel blocker, ARB, and statin therapy. Neetu Rosas did note a decreased EF however 2D echo reports normal EF. Patient is asymptomatic at this time. 3.  Hypertension-blood pressure today 120/82. No changes made. 4.  Hyperlipidemia-patient is statin intolerant this is managed by PCP. 5.  Chronic DVT-followed by vascular. Orders Placed This Encounter   Procedures    EKG 12 lead     Order Specific Question:   Reason for Exam?     Answer:   Hypertension     Return in about 6 months (around 3/17/2021). Call with any questionsor concerns  Follow up with Jodi Wilson MD for non cardiac problems  Report any new problems  Cardiovascular Fitness-Exercise as tolerated. Strive for 15 minutes of exercise most days of the week. Cardiac / HealthyDiet  Continue current medications as directed  Continue plan of treatment  It is always recommended that you bring your medicationsbottles with you to each visit - this is for your safety! Please do not hesitate to contact me for any questions or concerns. Sincerely yours,    HUNTER Johnson    This dictation was generated by voice recognition computer software. Although all attempts are made to edit dictation for accuracy, there may be errors in the transcription that are not intended.

## 2020-09-17 NOTE — PATIENT INSTRUCTIONS
Orders Placed This Encounter   Procedures    EKG 12 lead     Order Specific Question:   Reason for Exam?     Answer:   Hypertension     Return in about 6 months (around 3/17/2021). Call with any questionsor concerns  Follow up with Donavan Yates MD for non cardiac problems  Report any new problems  Cardiovascular Fitness-Exercise as tolerated. Strive for 15 minutes of exercise most days of the week. Cardiac / HealthyDiet  Continue current medications as directed  Continue plan of treatment  It is always recommended that you bring your medicationsbottles with you to each visit - this is for your safety!

## 2020-12-15 RX ORDER — APIXABAN 5 MG/1
TABLET, FILM COATED ORAL
Qty: 180 TABLET | Refills: 1 | Status: SHIPPED | OUTPATIENT
Start: 2020-12-15 | End: 2021-06-21

## 2020-12-29 ENCOUNTER — HOSPITAL ENCOUNTER (OUTPATIENT)
Dept: VASCULAR LAB | Age: 70
Discharge: HOME | End: 2020-12-29
Payer: MEDICARE

## 2020-12-29 PROCEDURE — 93971 EXTREMITY STUDY: CPT

## 2020-12-30 ENCOUNTER — VIRTUAL VISIT (OUTPATIENT)
Dept: VASCULAR SURGERY | Age: 70
End: 2020-12-30
Payer: MEDICARE

## 2020-12-30 PROCEDURE — 99441 PR PHYS/QHP TELEPHONE EVALUATION 5-10 MIN: CPT | Performed by: PHYSICIAN ASSISTANT

## 2020-12-30 NOTE — PROGRESS NOTES
Fidencio Antonio is a 65 y.o. male evaluated via telephone on 12/30/2020. Consent:  He and/or health care decision maker is aware that that he may receive a bill for this telephone service, depending on his insurance coverage, and has provided verbal consent to proceed: Yes      Documentation:  I communicated with the patient and/or health care decision maker about due to the covid-19 Pandemic, we are trying to limit exposures to our patients whom have elective follow ups. We are calling each individual patient to make sure they are doing okay.         Fidencio Antonio is a 65 y.o. male with the following history as recorded in [facility]:  Patient Active Problem List    Diagnosis Date Noted    Hx of blood clots     Atrial fibrillation ([facility] [address] 07/23/2020    History of coronary artery stent placement 08/10/2015    History of placement of stent in LAD coronary artery 11/17/2014    Hyperlipidemia 10/01/2012    Benign essential HTN 10/01/2012    Obstructive sleep apnea 10/01/2012    Polycythemia 10/01/2012    CAD (coronary artery disease) 09/28/2012     Current Outpatient Medications   Medication Sig Dispense Refill    ELIQUIS 5 MG TABS tablet TAKE 1 TABLET BY MOUTH TWICE A  tablet 1    amLODIPine (NORVASC) 5 MG tablet Take 5 mg by mouth daily      nitroGLYCERIN (NITROSTAT) 0.4 MG SL tablet Place 1 tablet under the tongue every 5 minutes as needed for Chest pain 25 tablet 3    nebivolol (BYSTOLIC) 10 MG tablet Take 1 tablet by mouth 2 times daily 180 tablet 3    losartan (COZAAR) 100 MG tablet Take 100 mg by mouth daily      triamterene-hydrochlorothiazide (MAXZIDE-25) 37.5-25 MG per tablet Take 1 tablet by mouth daily Indications: Patient is taking once a day but is 75/50 Mil (Patient taking differently: Take 1 tablet by mouth daily ) 90 tablet 3    furosemide (LASIX) 20 MG tablet Take 1 tablet by mouth daily (Patient taking differently: Take 20 mg by mouth daily as needed ) 90 tablet 3    Multiple Vitamins-Minerals (THERAPEUTIC MULTIVITAMIN-MINERALS) tablet Take 1 tablet by mouth daily      tamsulosin (FLOMAX) 0.4 MG capsule Take 0.4 mg by mouth 2 times daily      finasteride (PROSCAR) 5 MG tablet Take 5 mg by mouth daily      VENTOLIN  (90 Base) MCG/ACT inhaler INHALE 2 PUFFS BY MOUTH 4 TIMES A DAY AS NEEDED  0    simvastatin (ZOCOR) 10 MG tablet Take 1 tablet by mouth nightly 90 tablet 3    potassium chloride (KLOR-CON M) 20 MEQ extended release tablet Take 1 tablet by mouth 2 times daily 180 tablet 3    Sennosides (SENOKOT PO) Take 5 mg by mouth daily       allopurinol (ZYLOPRIM) 300 MG tablet Take 300 mg by mouth daily      esomeprazole (NEXIUM) 40 MG capsule Take 40 mg by mouth 2 times daily       Loratadine (CLARITIN) 10 MG CAPS Take 1 tablet by mouth daily as needed       Calcium Carbonate-Vitamin D (CALCIUM + D) 600-200 MG-UNIT TABS Take 1 tablet by mouth 2 times daily       aspirin 81 MG EC tablet Take 81 mg by mouth daily. No current facility-administered medications for this visit. Allergies: Patient has no known allergies. Past Medical History:   Diagnosis Date    Arthritis     Atrial fibrillation ([address] 7/23/2020    Benign essential HTN 10/1/2012    CAD (coronary artery disease) 9/28/2012    History of placement of stent in LAD coronary artery 11/17/2014    11/26/10 stents x 2 to mid LAD    Hx of blood clots     Hyperlipidemia 10/1/2012    Dr. Justin Lynch follows lipids.     Obstructive sleep apnea 10/1/2012    Polycythemia 10/1/2012    S/P PTCA (percutaneous transluminal coronary angioplasty) 9/28/2012     Past Surgical History:   Procedure Laterality Date    BACK SURGERY      CATARACT REMOVAL      both eyes    CORONARY ANGIOPLASTY WITH STENT PLACEMENT  1/2010    EYE SURGERY  07/2020    retina came loose    KNEE SURGERY      Jonathan    PTCA  2010    RETINAL DETACHMENT SURGERY  2020     Family History   Problem Relation Age of Onset    Heart Disease Mother  High Blood Pressure Mother     High Blood Pressure Father     Heart Disease Maternal Uncle      Social History     Tobacco Use    Smoking status: Former Smoker     Types: Cigarettes     Quit date: 10/1/1977     Years since quittin.2    Smokeless tobacco: Never Used   Substance Use Topics    Alcohol use: Yes     Comment: rarely     . Details of this discussion including any medical advice provided: Mr. Jamaica Groves is a 64 yo male who has a history of leg leg DVT in 2003 after bilateral TKR, past tobacco abuse and A-fib. He had a venous scan done on 2020 that showed evidence of a chronic partially occlusive DVT involving the left popliteal peroneal posterior tibial veins. Today we are following up for this. He reports continued swelling and redness left leg. He is on Eliquis 5 mg BID. He denies any issues with the Eliquis. Venous scan -       Impression        There is evidence of chronic deep vein thrombosis in the left lower    extremity involving the popliteal, veins.    Evidence of reflux in the deep veins of the left lower extremity.    The exam is technically limited due to edema ; therefore the portions of    the left tibial veins were not well visualized.        Signature        ----------------------------------------------------------------    Electronically signed by Stephanie Trinidad MD(Interpreting    physician) on 2020 11:11 AM    ----------------------------------------------------------------       Velocities are measured in cm/s ; Diameters are measured in mm       Right Lower Extremities DVT Study Measurements   Right 2D Measurements   +------------------------------------+----------+---------------+----------+   ! Location                            ! Visualized! Compressibility! Thrombosis! +------------------------------------+----------+---------------+----------+   ! Sapheno Femoral Junction            ! Yes       ! Yes            !None      ! +------------------------------------+----------+---------------+----------+   ! Common Femoral                      ! Yes       ! Yes            !None      !   +------------------------------------+----------+---------------+----------+       Left Lower Extremities DVT Study Measurements   Left 2D Measurements   +------------------------------------+----------+---------------+----------+   ! Location                            ! Visualized! Compressibility! Thrombosis! +------------------------------------+----------+---------------+----------+   ! External Iliac                      ! Yes       ! Yes            !None      !   +------------------------------------+----------+---------------+----------+   ! Sapheno Femoral Junction            ! Yes       ! Yes            !None      !   +------------------------------------+----------+---------------+----------+   ! Common Femoral                      ! Yes       ! Yes            !None      !   +------------------------------------+----------+---------------+----------+   ! Prox Femoral                        ! Yes       ! Yes            !None      !   +------------------------------------+----------+---------------+----------+   ! Mid Femoral                         ! Yes       ! Yes            !None      !   +------------------------------------+----------+---------------+----------+   ! Dist Femoral                        ! Yes       ! Yes            !None      !   +------------------------------------+----------+---------------+----------+   ! Deep Femoral                        ! Yes       ! Yes            !None      !   +------------------------------------+----------+---------------+----------+   ! Popliteal                           ! Yes       !Partial        ! Chronic   !   +------------------------------------+----------+---------------+----------+   ! SSV                                 ! Yes       ! Yes            !None      ! +------------------------------------+----------+---------------+----------+   ! Gastroc                             ! Yes       ! Yes            !None      !   +------------------------------------+----------+---------------+----------+   ! PTV                                 !Partial   ! Yes            !None      !   +------------------------------------+----------+---------------+----------+   ! GSV                                 ! Yes       ! Yes            !None      !   +------------------------------------+----------+---------------+----------+   ! ATV                                 !Partial   ! Yes            !None      !   +------------------------------------+----------+---------------+----------+   ! Peroneal                            !Partial   ! Yes            !None      !   +------------------------------------+----------+---------------+----------+   ! Soleal                              !No        !               !          !   +------------------------------------+----------+---------------+----------+       Left Doppler Measurements   +---------+------+------+--------------------------------------------------+   ! Location ! Signal!Reflux! Reflux (msec)                                     !   +---------+------+------+--------------------------------------------------+   ! Popliteal!      ! Yes   !1086                                              !   +---------+------+------+--------------------------------------------------+           Assessment/Plan -      1. History left leg DVT (provoked)  2. DVT left leg  3. Leg swelling      Recommend he continue Eliquis 5 mg BID  Recommend leg elevation above the level of the heart  Recommend continue compression stockings/ace wrap daily  We will f/u in 3 months with a repeat left leg venous scan sooner if he develops increased swelling pain or redness.         I affirm this is a Patient Initiated Episode with a Patient who has not had a related appointment within my department in the past 7 days or scheduled within the next 24 hours.     Patient identification was verified at the start of the visit: Yes    Total Time: minutes: 5-10 minutes    Note: not billable if this call serves to triage the patient into an appointment for the relevant concern      Ruben Lewis

## 2021-01-25 ENCOUNTER — TELEPHONE (OUTPATIENT)
Dept: CARDIOLOGY CLINIC | Age: 71
End: 2021-01-25

## 2021-01-25 RX ORDER — METOPROLOL SUCCINATE 25 MG/1
12.5 TABLET, EXTENDED RELEASE ORAL DAILY
Qty: 45 TABLET | Refills: 0 | Status: SHIPPED | OUTPATIENT
Start: 2021-01-25 | End: 2021-05-05

## 2021-01-25 NOTE — TELEPHONE ENCOUNTER
Pt states he needs a different med other than Bystolic, it is $098.51 and he is unable to afford. Please call & advise pt.

## 2021-02-24 RX ORDER — TRIAMTERENE AND HYDROCHLOROTHIAZIDE 37.5; 25 MG/1; MG/1
1 TABLET ORAL DAILY
Qty: 90 TABLET | Refills: 3 | Status: SHIPPED | OUTPATIENT
Start: 2021-02-24

## 2021-03-15 ENCOUNTER — OFFICE VISIT (OUTPATIENT)
Dept: CARDIOLOGY CLINIC | Age: 71
End: 2021-03-15
Payer: MEDICARE

## 2021-03-15 VITALS
WEIGHT: 268 LBS | BODY MASS INDEX: 37.52 KG/M2 | HEART RATE: 81 BPM | SYSTOLIC BLOOD PRESSURE: 138 MMHG | DIASTOLIC BLOOD PRESSURE: 80 MMHG | OXYGEN SATURATION: 98 % | HEIGHT: 71 IN

## 2021-03-15 DIAGNOSIS — I25.10 CORONARY ARTERY DISEASE INVOLVING NATIVE CORONARY ARTERY OF NATIVE HEART WITHOUT ANGINA PECTORIS: Chronic | ICD-10-CM

## 2021-03-15 DIAGNOSIS — E78.2 MIXED HYPERLIPIDEMIA: Chronic | ICD-10-CM

## 2021-03-15 DIAGNOSIS — I10 BENIGN ESSENTIAL HTN: Chronic | ICD-10-CM

## 2021-03-15 DIAGNOSIS — I48.0 PAROXYSMAL ATRIAL FIBRILLATION (HCC): Primary | ICD-10-CM

## 2021-03-15 DIAGNOSIS — Z86.718 HX OF BLOOD CLOTS: ICD-10-CM

## 2021-03-15 PROCEDURE — 93000 ELECTROCARDIOGRAM COMPLETE: CPT | Performed by: NURSE PRACTITIONER

## 2021-03-15 PROCEDURE — 99213 OFFICE O/P EST LOW 20 MIN: CPT | Performed by: NURSE PRACTITIONER

## 2021-03-15 ASSESSMENT — ENCOUNTER SYMPTOMS
NAUSEA: 0
EYE REDNESS: 0
ABDOMINAL PAIN: 0
VOMITING: 0
BACK PAIN: 0
COUGH: 0
SHORTNESS OF BREATH: 0
BLOOD IN STOOL: 0
FACIAL SWELLING: 0
WHEEZING: 0
EYE DISCHARGE: 0
TROUBLE SWALLOWING: 0
ABDOMINAL DISTENTION: 0
COLOR CHANGE: 0

## 2021-03-15 NOTE — PROGRESS NOTES
[address] [place] [address] Cardiology  [address]  [address]  Phone: [phone]  Fax: [phone]    OFFICE VISIT:  3/15/2021    Hailey Garcia - : 1950    Reason For Visit:  Miriam Figueroa is a 66 y.o. male who is here for 6 Month Follow-Up (Patient denies any cardiac symptoms. ), Atrial Fibrillation, Coronary Artery Disease, and Hypertension      HPI    Mr. Torsten Marion is a 66 y.o. male with history of coronary artery disease, hypertension, hyperlipidemia, DVT (left leg), former nicotine dependence, and a family history of CAD who presents with the chief complaint of follow-up 6 month follow-up. Patient states he has been doing well since previous appointment. He still has chronic lower extremity edema edema. His right leg has been itchy and flaky she has been treating this with lotion at home. He does have left lower extremity edema that was found to have a chronic DVT. Patient states he has not had to stop Eliquis nor has he missed a dose. No further episodes of atrial fibrillation since cardioversion. Miriam Figueroa has no exertional chest pain, pressure, burning, tightness or squeezing. No symptomatic tachy- or bryant-arrhythmia. No lightheadedness, dizziness, or syncope. No numbness or weakness to suggest cerebrovascular accident or transient ischemic attack. he denies signs of bleeding. Reports no shortness of breath. He denies orthopnea or paroxysmal nocturnal dyspnea. he is sleeping on 2 pillow at night. he has been compliant with his medications. his BP has been controlled at home. he reports no activity change. PCP follows lipids and labs. Inocencia Rojas MD is PCP.   Hailey Garcia has the following history as recorded in [facility]:    Patient Active Problem List    Diagnosis Date Noted    Hx of blood clots     Atrial fibrillation ([facility] [address] 2020    History of coronary artery stent placement 08/10/2015    History of placement of stent in LAD coronary artery 2014    Hyperlipidemia 10/01/2012    Benign essential HTN 10/01/2012    Obstructive sleep apnea 10/01/2012    Polycythemia 10/01/2012    CAD (coronary artery disease) 2012     Past Medical History:   Diagnosis Date    Arthritis     Atrial fibrillation ([facility] [address] 2020    Benign essential HTN 10/1/2012    CAD (coronary artery disease) 2012    History of placement of stent in LAD coronary artery 2014    11/26/10 stents x 2 to mid LAD    Hx of blood clots     Hyperlipidemia 10/1/2012    Dr. Leona Alvarado follows lipids.     Obstructive sleep apnea 10/1/2012    Polycythemia 10/1/2012    S/P PTCA (percutaneous transluminal coronary angioplasty) 2012     Past Surgical History:   Procedure Laterality Date    BACK SURGERY      CATARACT REMOVAL      both eyes    CORONARY ANGIOPLASTY WITH STENT PLACEMENT  2010    EYE SURGERY  2020    retina came loose    KNEE SURGERY      Jonathan    PTCA      RETINAL DETACHMENT SURGERY       Family History   Problem Relation Age of Onset    Heart Disease Mother     High Blood Pressure Mother     High Blood Pressure Father     Heart Disease Maternal Uncle      Social History     Tobacco Use    Smoking status: Former Smoker     Types: Cigarettes     Quit date: 10/1/1977     Years since quittin.4    Smokeless tobacco: Never Used   Substance Use Topics    Alcohol use: Yes     Comment: rarely      Current Outpatient Medications   Medication Sig Dispense Refill    triamterene-hydroCHLOROthiazide (MAXZIDE-25) 37.5-25 MG per tablet Take 1 tablet by mouth daily 90 tablet 3    metoprolol succinate (TOPROL XL) 25 MG extended release tablet Take 0.5 tablets by mouth daily 45 tablet 0    ELIQUIS 5 MG TABS tablet TAKE 1 TABLET BY MOUTH TWICE A  tablet 1    amLODIPine (NORVASC) 5 MG tablet Take 5 mg by mouth daily      nitroGLYCERIN (NITROSTAT) 0.4 MG SL tablet Place 1 tablet under the tongue every 5 minutes as needed for Chest pain 25 tablet 3    losartan (COZAAR) 100 MG tablet Take 100 mg by mouth daily      furosemide (LASIX) 20 MG tablet Take 1 tablet by mouth daily 90 tablet 3    Multiple Vitamins-Minerals (THERAPEUTIC MULTIVITAMIN-MINERALS) tablet Take 1 tablet by mouth daily      tamsulosin (FLOMAX) 0.4 MG capsule Take 0.4 mg by mouth 2 times daily      finasteride (PROSCAR) 5 MG tablet Take 5 mg by mouth daily      VENTOLIN  (90 Base) MCG/ACT inhaler INHALE 2 PUFFS BY MOUTH 4 TIMES A DAY AS NEEDED  0    simvastatin (ZOCOR) 10 MG tablet Take 1 tablet by mouth nightly 90 tablet 3    potassium chloride (KLOR-CON M) 20 MEQ extended release tablet Take 1 tablet by mouth 2 times daily 180 tablet 3    Sennosides (SENOKOT PO) Take 5 mg by mouth daily       allopurinol (ZYLOPRIM) 300 MG tablet Take 300 mg by mouth daily      esomeprazole (NEXIUM) 40 MG capsule Take 40 mg by mouth 2 times daily       Loratadine (CLARITIN) 10 MG CAPS Take 1 tablet by mouth daily as needed       Calcium Carbonate-Vitamin D (CALCIUM + D) 600-200 MG-UNIT TABS Take 1 tablet by mouth 2 times daily       aspirin 81 MG EC tablet Take 81 mg by mouth daily. No current facility-administered medications for this visit. Allergies: Patient has no known allergies. Review of Systems  Review of Systems   Constitutional: Negative for activity change, diaphoresis, fatigue, fever and unexpected weight change. HENT: Negative for facial swelling, nosebleeds and trouble swallowing. Eyes: Negative for discharge, redness and visual disturbance. Respiratory: Negative for cough, shortness of breath and wheezing. Cardiovascular: Positive for leg swelling. Negative for chest pain and palpitations. Gastrointestinal: Negative for abdominal distention, abdominal pain, blood in stool, nausea and vomiting. Endocrine: Negative for cold intolerance and heat intolerance. Genitourinary: Negative for dysuria and hematuria.    Musculoskeletal: Negative for back pain ECG 03/15/21  Sinus rhythm with nonspecific ST-T wave abnormalities, 60 bpm    QTc 0.406 ms    1/2010 Linda Hi, cath EF 55%, LMCA 15%, LAD 99%, diagonal 40%, PTCA with drug-eluting stents x2 to LAD  8/6/2015  SE negative for myocardial ischemia  7/9/2020 TTE mild LVH, significant diastolic dysfunction, mild MR, mild TR, estimated EF 55 to 60%  7/9/2020 Georgina there is no ischemia, calculated EF 36% (normal on 2D echo)  8/20/2020 [facility] successful cardioversion from atrial fibrillation to normal sinus rhythm. Other data:  7/9/2020 venous study of lower extremity evidence of chronic partially occlusive DVT in the left lower extremity involving the popliteal, peroneal, and posterior tibial veins. There is no evidence of superficial thrombophlebitis of the bilateral lower extremities, no evidence of DVT in the right lower extremity    Assessment, Recommendations, & Plan:  66 y.o. male with      Diagnosis Orders   1. Paroxysmal atrial fibrillation (HCC)  EKG 12 lead   2. Coronary artery disease involving native coronary artery of native heart without angina pectoris         1. Proximal atrial fibrillation-patient is sinus rhythm today. He is on Eliquis and metoprolol. Continue current medication regimen. [id number] Score for Atrial Fibrillation Stroke Risk   Risk   Factors  Component Value   C CHF No 0   H HTN Yes 1   A2 Age >= 76 No,  (76 y.o.) 0   D DM No 0   S2 Prior Stroke/TIA No 0   V Vascular Disease No 0   A Age 74-69 Yes,  (76 y.o.) 1   Sc Sex male 0    [id number]  Score  2   Score last updated 6/23/20 24:57 AM CDT    Click here for a link to the UpToDate guideline \"Atrial Fibrillation: Anticoagulation therapy to prevent embolization    Disclaimer: Risk Score calculation is dependent on accuracy of patient problem list and past encounter diagnosis. 2.  CAD-stable. patient is on beta-blocker, HCTZ, calcium channel blocker, ARB, and statin therapy. 3.  Hypertension-blood pressure today 138/80. No changes made. 4.  Hyperlipidemia-patient is statin intolerant this is managed by PCP. 5.  Chronic DVT-followed by vascular. Discussed with patient to follow-up with PCP concerning right leg. Patient voiced understanding and stated he will. Orders Placed This Encounter   Procedures    EKG 12 lead     Order Specific Question:   Reason for Exam?     Answer: Other     Return in about 6 months (around 9/15/2021) for Dr. Teena Whiteside. Call with any questionsor concerns  Follow up with Kyler Ye MD for non cardiac problems  Report any new problems  Cardiovascular Fitness-Exercise as tolerated. Strive for 15 minutes of exercise most days of the week. Cardiac / HealthyDiet  Continue current medications as directed  Continue plan of treatment  It is always recommended that you bring your medicationsbottles with you to each visit - this is for your safety! Please do not hesitate to contact me for any questions or concerns. Sincerely yours,    HUNTER Ball    This dictation was generated by voice recognition computer software. Although all attempts are made to edit dictation for accuracy, there may be errors in the transcription that are not intended.

## 2021-03-15 NOTE — PATIENT INSTRUCTIONS
Orders Placed This Encounter   Procedures    EKG 12 lead     Order Specific Question:   Reason for Exam?     Answer: Other     Return in about 6 months (around 9/15/2021). Call with any questionsor concerns  Follow up with Tiffanie Thomson MD for non cardiac problems  Report any new problems  Cardiovascular Fitness-Exercise as tolerated. Strive for 15 minutes of exercise most days of the week. Cardiac / HealthyDiet  Continue current medications as directed  Continue plan of treatment  It is always recommended that you bring your medicationsbottles with you to each visit - this is for your safety!

## 2021-04-06 ENCOUNTER — HOSPITAL ENCOUNTER (OUTPATIENT)
Dept: VASCULAR LAB | Age: 71
Discharge: HOME | End: 2021-04-06
Payer: MEDICARE

## 2021-04-06 DIAGNOSIS — Z86.718 HX OF BLOOD CLOTS: ICD-10-CM

## 2021-04-06 PROCEDURE — 93971 EXTREMITY STUDY: CPT

## 2021-04-07 ENCOUNTER — VIRTUAL VISIT (OUTPATIENT)
Dept: VASCULAR SURGERY | Age: 71
End: 2021-04-07
Payer: MEDICARE

## 2021-04-07 DIAGNOSIS — I82.532 CHRONIC DEEP VEIN THROMBOSIS (DVT) OF POPLITEAL VEIN OF LEFT LOWER EXTREMITY (HCC): Primary | ICD-10-CM

## 2021-04-07 PROCEDURE — 99213 OFFICE O/P EST LOW 20 MIN: CPT | Performed by: PHYSICIAN ASSISTANT

## 2021-04-07 NOTE — PROGRESS NOTES
José Manuel Perez is a 66 y.o. male evaluated via telephone on 4/7/2021. Consent:  He and/or health care decision maker is aware that that he may receive a bill for this telephone service, depending on his insurance coverage, and has provided verbal consent to proceed: Yes      Documentation:  I communicated with the patient and/or health care decision maker about due to the covid-19 Pandemic, we are trying to limit exposures to our patients whom have elective follow ups. We are calling each individual patient to make sure they are doing okay.         José Manuel Perez is a 66 y.o. male with the following history as recorded in [facility]:  Patient Active Problem List    Diagnosis Date Noted    Hx of blood clots     Atrial fibrillation ([facility] [address] 07/23/2020    History of coronary artery stent placement 08/10/2015    History of placement of stent in LAD coronary artery 11/17/2014    Hyperlipidemia 10/01/2012    Benign essential HTN 10/01/2012    Obstructive sleep apnea 10/01/2012    Polycythemia 10/01/2012    CAD (coronary artery disease) 09/28/2012     Current Outpatient Medications   Medication Sig Dispense Refill    triamterene-hydroCHLOROthiazide (MAXZIDE-25) 37.5-25 MG per tablet Take 1 tablet by mouth daily 90 tablet 3    metoprolol succinate (TOPROL XL) 25 MG extended release tablet Take 0.5 tablets by mouth daily 45 tablet 0    ELIQUIS 5 MG TABS tablet TAKE 1 TABLET BY MOUTH TWICE A  tablet 1    amLODIPine (NORVASC) 5 MG tablet Take 5 mg by mouth daily      nitroGLYCERIN (NITROSTAT) 0.4 MG SL tablet Place 1 tablet under the tongue every 5 minutes as needed for Chest pain 25 tablet 3    losartan (COZAAR) 100 MG tablet Take 100 mg by mouth daily      furosemide (LASIX) 20 MG tablet Take 1 tablet by mouth daily 90 tablet 3    Multiple Vitamins-Minerals (THERAPEUTIC MULTIVITAMIN-MINERALS) tablet Take 1 tablet by mouth daily      tamsulosin (FLOMAX) 0.4 MG capsule Take 0.4 mg by mouth 2 times daily      finasteride (PROSCAR) 5 MG tablet Take 5 mg by mouth daily      VENTOLIN  (90 Base) MCG/ACT inhaler INHALE 2 PUFFS BY MOUTH 4 TIMES A DAY AS NEEDED  0    simvastatin (ZOCOR) 10 MG tablet Take 1 tablet by mouth nightly 90 tablet 3    potassium chloride (KLOR-CON M) 20 MEQ extended release tablet Take 1 tablet by mouth 2 times daily 180 tablet 3    Sennosides (SENOKOT PO) Take 5 mg by mouth daily       allopurinol (ZYLOPRIM) 300 MG tablet Take 300 mg by mouth daily      esomeprazole (NEXIUM) 40 MG capsule Take 40 mg by mouth 2 times daily       Loratadine (CLARITIN) 10 MG CAPS Take 1 tablet by mouth daily as needed       Calcium Carbonate-Vitamin D (CALCIUM + D) 600-200 MG-UNIT TABS Take 1 tablet by mouth 2 times daily       aspirin 81 MG EC tablet Take 81 mg by mouth daily. No current facility-administered medications for this visit. Allergies: Patient has no known allergies. Past Medical History:   Diagnosis Date    Arthritis     Atrial fibrillation ([facility] [address] 7/23/2020    Benign essential HTN 10/1/2012    CAD (coronary artery disease) 9/28/2012    History of placement of stent in LAD coronary artery 11/17/2014    11/26/10 stents x 2 to mid LAD    Hx of blood clots     Hyperlipidemia 10/1/2012    Dr. Alisha Cuenca follows lipids.     Obstructive sleep apnea 10/1/2012    Polycythemia 10/1/2012    S/P PTCA (percutaneous transluminal coronary angioplasty) 9/28/2012     Past Surgical History:   Procedure Laterality Date    BACK SURGERY      CATARACT REMOVAL      both eyes    CORONARY ANGIOPLASTY WITH STENT PLACEMENT  1/2010    EYE SURGERY  07/2020    retina came loose    KNEE SURGERY      Jonathan    PTCA  2010    RETINAL DETACHMENT SURGERY  2020     Family History   Problem Relation Age of Onset    Heart Disease Mother     High Blood Pressure Mother     High Blood Pressure Father     Heart Disease Maternal Uncle      Social History     Tobacco Use    Smoking status: Former Smoker     Types: Cigarettes     Quit date: 10/1/1977     Years since quittin.5    Smokeless tobacco: Never Used   Substance Use Topics    Alcohol use: Yes     Comment: rarely     Venous scan -       Impression        There is evidence of chronic deep vein thrombosis in the left lower    extremity involving the popliteal, veins.    The exam is technically limited due to edema ; therefore the portions of    the left tibial veins were not well visualized.    Evidence of reflux in the deep veins of the left lower extremity.        Signature        ----------------------------------------------------------------    Electronically signed by Marques Peña MD(Interpreting    physician) on 2021 01:41 PM    ----------------------------------------------------------------       Velocities are measured in cm/s ; Diameters are measured in mm       Right Lower Extremities DVT Study Measurements   Right 2D Measurements   +------------------------------------+----------+---------------+----------+   ! Location                            ! Visualized! Compressibility! Thrombosis! +------------------------------------+----------+---------------+----------+   ! Sapheno Femoral Junction            ! Yes       ! Yes            !None      !   +------------------------------------+----------+---------------+----------+   ! Common Femoral                      ! Yes       ! Yes            !None      !   +------------------------------------+----------+---------------+----------+       Left Lower Extremities DVT Study Measurements   Left 2D Measurements   +------------------------------------+----------+---------------+----------+   ! Location                            ! Visualized! Compressibility! Thrombosis! +------------------------------------+----------+---------------+----------+   ! Sapheno Femoral Junction            ! Yes       ! Yes            !None      ! +------------------------------------+----------+---------------+----------+   ! Common Femoral                      ! Yes       ! Yes            !None      !   +------------------------------------+----------+---------------+----------+   ! Prox Femoral                        ! Yes       ! Yes            !None      !   +------------------------------------+----------+---------------+----------+   ! Mid Femoral                         ! Yes       ! Yes            !None      !   +------------------------------------+----------+---------------+----------+   ! Dist Femoral                        ! Yes       ! Yes            !None      !   +------------------------------------+----------+---------------+----------+   ! Deep Femoral                        ! Yes       ! Yes            !None      !   +------------------------------------+----------+---------------+----------+   ! Popliteal                           ! Yes       !Partial        !          !   +------------------------------------+----------+---------------+----------+   ! SSV                                 ! Yes       ! Yes            !None      !   +------------------------------------+----------+---------------+----------+   ! Gastroc                             ! Yes       ! Yes            !None      !   +------------------------------------+----------+---------------+----------+   ! PTV                                 !Partial   ! Yes            !None      !   +------------------------------------+----------+---------------+----------+   ! GSV                                 ! Yes       ! Yes            !None      !   +------------------------------------+----------+---------------+----------+   ! ATV                                 !Partial   ! Yes            !None      !   +------------------------------------+----------+---------------+----------+   ! Peroneal                            !Partial   ! Yes            !None      ! +------------------------------------+----------+---------------+----------+           Details of this discussion including any medical advice provided: Mr. Willi Szymanski is a 64 yo male who has a history of leg leg DVT in 2003 after bilateral TKR, past tobacco abuse and A-fib. He had a venous scan done on 7/9/2020 that showed evidence of a chronic partially occlusive DVT involving the left popliteal peroneal posterior tibial veins. Today we are following up for this. He is on Eliquis 5 mg BID. He denies any issues with the Eliquis. No c/o significant leg swelling, redness or pain. Assessment/Plan -      1. History left leg DVT (provoked)  2. DVT left leg  3. Leg swelling      Recommend leg elevation above the level of the heart  Recommend continue compression stockings/ace wrap daily  I will check with Cardiology to get their recommendatin regarding continuing anticoagulation. We will notify the patient with further recommendations. I affirm this is a Patient Initiated Episode with a Patient who has not had a related appointment within my department in the past 7 days or scheduled within the next 24 hours.     Patient identification was verified at the start of the visit: Yes    Total Time: minutes: 5-10 minutes    Note: not billable if this call serves to triage the patient into an appointment for the relevant concern      Lon Murrieta

## 2021-04-09 ENCOUNTER — TELEPHONE (OUTPATIENT)
Dept: VASCULAR SURGERY | Age: 71
End: 2021-04-09

## 2021-04-09 ENCOUNTER — ANTI-COAG VISIT (OUTPATIENT)
Dept: VASCULAR SURGERY | Age: 71
End: 2021-04-09

## 2021-04-09 NOTE — TELEPHONE ENCOUNTER
----- Message from Jonatan Torres PA-C sent at 4/9/2021  2:21 PM CDT -----  Please let him know that Cardiology advises he continue his anticoagulation.

## 2021-04-09 NOTE — TELEPHONE ENCOUNTER
Left voicemail for patient that cardiology wants him to continue taking his blood thinner and to call our office with any questions he may have.

## 2021-05-05 RX ORDER — METOPROLOL SUCCINATE 25 MG/1
TABLET, EXTENDED RELEASE ORAL
Qty: 45 TABLET | Refills: 3 | Status: SHIPPED | OUTPATIENT
Start: 2021-05-05

## 2021-06-21 RX ORDER — APIXABAN 5 MG/1
TABLET, FILM COATED ORAL
Qty: 180 TABLET | Refills: 1 | Status: SHIPPED | OUTPATIENT
Start: 2021-06-21

## 2021-06-28 ENCOUNTER — NURSE TRIAGE (OUTPATIENT)
Dept: OTHER | Facility: CLINIC | Age: 71
End: 2021-06-28

## 2021-06-28 NOTE — TELEPHONE ENCOUNTER
Reason for Disposition   New or worsened shortness of breath with activity (dyspnea on exertion)    Answer Assessment - Initial Assessment Questions  1. DESCRIPTION: \"Please describe your heart rate or heart beat that you are having\" (e.g., fast/slow, regular/irregular, skipped or extra beats, \"palpitations\")         When checking it is high and then it will go back down but pt was unable to explain           Pt checked it a few minutes before calling and it was 107, checked it while speaking to writer and it was 127/91               2. ONSET: \"When did it start? \" (Minutes, hours or days)           About a week ago with the highest being 127             2-3 weeks ago it was where it normally is in the high 90s     3. DURATION: \"How long does it last\" (e.g., seconds, minutes, hours)           Pt states every time he checks it, it is above 100              4. PATTERN \"Does it come and go, or has it been constant since it started? \"  \"Does it get worse with exertion? \"   \"Are you feeling it now? \"            It is fluctuating between 103 and 127 over the past week     5. TAP: \"Using your hand, can you tap out what you are feeling on a chair or table in front of you, so that I can hear? \" (Note: not all patients can do this)          6. HEART RATE: \"Can you tell me your heart rate? \" \"How many beats in 15 seconds? \"  (Note: not all patients can do this)             103 currently     7. RECURRENT SYMPTOM: \"Have you ever had this before? \" If so, ask: \"When was the last time? \" and \"What happened that time? \"             Denies in past     8. CAUSE: \"What do you think is causing the palpitations? \"        9. CARDIAC HISTORY: \"Do you have any history of heart disease? \" (e.g., heart attack, angina, bypass surgery, angioplasty, arrhythmia)              Only HTN and checks it daily and that is how he noticed it higher     10. OTHER SYMPTOMS: \"Do you have any other symptoms? \" (e.g., dizziness, chest pain, sweating, difficulty breathing)          Fatigued more recently- \"I can work just a little bit and I give out quick\"           Denies all other symptoms          !!! Pt did appear to be confused to writer and assessment was difficult but pt denied any confusion at this time. 11. PREGNANCY: \"Is there any chance you are pregnant? \" \"When was your last menstrual period? \"         NA    Protocols used: HEART RATE AND HEARTBEAT QUESTIONS-ADULT-[place]    Received call from [facility] at [facility] [facility] with Red Flag Complaint. Brief description of triage: see above     Triage indicates for patient to go to [address] [place] [address] for new onset of SOB with tachycardia and fatigue for the past week. Writer called Cardiology office and spoke to Mariaa Junior who was able to get [place] NP with recommendation of ED now. Writer went back to phone and could hear son telling pt that I was on the phone and then at some point they hung up on me. Writer called back x3 and it went to [facility] at this time. Pt called back and stated he dropped his phone. Writer spoke to pt and was able to give him the dispo above. Pt verbalized understanding and was agreeable to plan at this time. He does have someone that can drive him. Care advice provided, patient verbalizes understanding; denies any other questions or concerns; instructed to call back for any new or worsening symptoms. Attention Provider: Thank you for allowing me to participate in the care of your patient. The patient was connected to triage in response to information provided to the [facility]. Please do not respond through this encounter as the response is not directed to a shared pool.